# Patient Record
Sex: FEMALE | Race: OTHER | URBAN - METROPOLITAN AREA
[De-identification: names, ages, dates, MRNs, and addresses within clinical notes are randomized per-mention and may not be internally consistent; named-entity substitution may affect disease eponyms.]

---

## 2020-10-29 ENCOUNTER — INPATIENT (INPATIENT)
Facility: HOSPITAL | Age: 75
LOS: 6 days | Discharge: NURSING, REHAB OR LONG-TERM CARE | DRG: 25 | End: 2020-11-05
Attending: NEUROLOGICAL SURGERY | Admitting: NEUROLOGICAL SURGERY
Payer: MEDICARE

## 2020-10-29 VITALS
OXYGEN SATURATION: 96 % | DIASTOLIC BLOOD PRESSURE: 99 MMHG | TEMPERATURE: 98 F | SYSTOLIC BLOOD PRESSURE: 176 MMHG | HEART RATE: 66 BPM | HEIGHT: 68 IN | WEIGHT: 179.9 LBS | RESPIRATION RATE: 18 BRPM

## 2020-10-29 DIAGNOSIS — D49.6 NEOPLASM OF UNSPECIFIED BEHAVIOR OF BRAIN: ICD-10-CM

## 2020-10-29 DIAGNOSIS — I10 ESSENTIAL (PRIMARY) HYPERTENSION: ICD-10-CM

## 2020-10-29 LAB
ALBUMIN SERPL ELPH-MCNC: 3.8 G/DL — SIGNIFICANT CHANGE UP (ref 3.3–5)
ALP SERPL-CCNC: 58 U/L — SIGNIFICANT CHANGE UP (ref 40–120)
ALT FLD-CCNC: 21 U/L — SIGNIFICANT CHANGE UP (ref 10–45)
ANION GAP SERPL CALC-SCNC: 10 MMOL/L — SIGNIFICANT CHANGE UP (ref 5–17)
APTT BLD: 22.8 SEC — LOW (ref 27.5–35.5)
AST SERPL-CCNC: 17 U/L — SIGNIFICANT CHANGE UP (ref 10–40)
BASOPHILS # BLD AUTO: 0.01 K/UL — SIGNIFICANT CHANGE UP (ref 0–0.2)
BASOPHILS NFR BLD AUTO: 0.1 % — SIGNIFICANT CHANGE UP (ref 0–2)
BILIRUB SERPL-MCNC: 0.3 MG/DL — SIGNIFICANT CHANGE UP (ref 0.2–1.2)
BLD GP AB SCN SERPL QL: NEGATIVE — SIGNIFICANT CHANGE UP
BUN SERPL-MCNC: 13 MG/DL — SIGNIFICANT CHANGE UP (ref 7–23)
CALCIUM SERPL-MCNC: 9.3 MG/DL — SIGNIFICANT CHANGE UP (ref 8.4–10.5)
CHLORIDE SERPL-SCNC: 104 MMOL/L — SIGNIFICANT CHANGE UP (ref 96–108)
CO2 SERPL-SCNC: 27 MMOL/L — SIGNIFICANT CHANGE UP (ref 22–31)
CREAT SERPL-MCNC: 0.65 MG/DL — SIGNIFICANT CHANGE UP (ref 0.5–1.3)
EOSINOPHIL # BLD AUTO: 0 K/UL — SIGNIFICANT CHANGE UP (ref 0–0.5)
EOSINOPHIL NFR BLD AUTO: 0 % — SIGNIFICANT CHANGE UP (ref 0–6)
GLUCOSE BLDC GLUCOMTR-MCNC: 122 MG/DL — HIGH (ref 70–99)
GLUCOSE SERPL-MCNC: 130 MG/DL — HIGH (ref 70–99)
HCT VFR BLD CALC: 47.1 % — HIGH (ref 34.5–45)
HGB BLD-MCNC: 15.3 G/DL — SIGNIFICANT CHANGE UP (ref 11.5–15.5)
IMM GRANULOCYTES NFR BLD AUTO: 0.8 % — SIGNIFICANT CHANGE UP (ref 0–1.5)
INR BLD: 1.01 — SIGNIFICANT CHANGE UP (ref 0.88–1.16)
LYMPHOCYTES # BLD AUTO: 0.76 K/UL — LOW (ref 1–3.3)
LYMPHOCYTES # BLD AUTO: 8.4 % — LOW (ref 13–44)
MCHC RBC-ENTMCNC: 29.6 PG — SIGNIFICANT CHANGE UP (ref 27–34)
MCHC RBC-ENTMCNC: 32.5 GM/DL — SIGNIFICANT CHANGE UP (ref 32–36)
MCV RBC AUTO: 91.1 FL — SIGNIFICANT CHANGE UP (ref 80–100)
MONOCYTES # BLD AUTO: 0.43 K/UL — SIGNIFICANT CHANGE UP (ref 0–0.9)
MONOCYTES NFR BLD AUTO: 4.7 % — SIGNIFICANT CHANGE UP (ref 2–14)
NEUTROPHILS # BLD AUTO: 7.83 K/UL — HIGH (ref 1.8–7.4)
NEUTROPHILS NFR BLD AUTO: 86 % — HIGH (ref 43–77)
NRBC # BLD: 0 /100 WBCS — SIGNIFICANT CHANGE UP (ref 0–0)
PLATELET # BLD AUTO: 225 K/UL — SIGNIFICANT CHANGE UP (ref 150–400)
POTASSIUM SERPL-MCNC: 4.4 MMOL/L — SIGNIFICANT CHANGE UP (ref 3.5–5.3)
POTASSIUM SERPL-SCNC: 4.4 MMOL/L — SIGNIFICANT CHANGE UP (ref 3.5–5.3)
PROT SERPL-MCNC: 5.8 G/DL — LOW (ref 6–8.3)
PROTHROM AB SERPL-ACNC: 12.1 SEC — SIGNIFICANT CHANGE UP (ref 10.6–13.6)
RBC # BLD: 5.17 M/UL — SIGNIFICANT CHANGE UP (ref 3.8–5.2)
RBC # FLD: 12.6 % — SIGNIFICANT CHANGE UP (ref 10.3–14.5)
RH IG SCN BLD-IMP: POSITIVE — SIGNIFICANT CHANGE UP
SARS-COV-2 RNA SPEC QL NAA+PROBE: SIGNIFICANT CHANGE UP
SODIUM SERPL-SCNC: 141 MMOL/L — SIGNIFICANT CHANGE UP (ref 135–145)
WBC # BLD: 9.1 K/UL — SIGNIFICANT CHANGE UP (ref 3.8–10.5)
WBC # FLD AUTO: 9.1 K/UL — SIGNIFICANT CHANGE UP (ref 3.8–10.5)

## 2020-10-29 PROCEDURE — 99222 1ST HOSP IP/OBS MODERATE 55: CPT

## 2020-10-29 PROCEDURE — 71045 X-RAY EXAM CHEST 1 VIEW: CPT | Mod: 26

## 2020-10-29 PROCEDURE — 93970 EXTREMITY STUDY: CPT | Mod: 26

## 2020-10-29 PROCEDURE — 99285 EMERGENCY DEPT VISIT HI MDM: CPT | Mod: CS

## 2020-10-29 RX ORDER — CARVEDILOL PHOSPHATE 80 MG/1
6.25 CAPSULE, EXTENDED RELEASE ORAL EVERY 12 HOURS
Refills: 0 | Status: DISCONTINUED | OUTPATIENT
Start: 2020-10-29 | End: 2020-11-02

## 2020-10-29 RX ORDER — DEXTROSE 50 % IN WATER 50 %
25 SYRINGE (ML) INTRAVENOUS ONCE
Refills: 0 | Status: DISCONTINUED | OUTPATIENT
Start: 2020-10-29 | End: 2020-11-02

## 2020-10-29 RX ORDER — LEVETIRACETAM 250 MG/1
500 TABLET, FILM COATED ORAL
Refills: 0 | Status: DISCONTINUED | OUTPATIENT
Start: 2020-10-29 | End: 2020-11-02

## 2020-10-29 RX ORDER — PANTOPRAZOLE SODIUM 20 MG/1
40 TABLET, DELAYED RELEASE ORAL
Refills: 0 | Status: DISCONTINUED | OUTPATIENT
Start: 2020-10-29 | End: 2020-11-02

## 2020-10-29 RX ORDER — ACETAMINOPHEN 500 MG
650 TABLET ORAL EVERY 6 HOURS
Refills: 0 | Status: DISCONTINUED | OUTPATIENT
Start: 2020-10-29 | End: 2020-11-02

## 2020-10-29 RX ORDER — DEXAMETHASONE 0.5 MG/5ML
4 ELIXIR ORAL
Refills: 0 | Status: DISCONTINUED | OUTPATIENT
Start: 2020-10-29 | End: 2020-10-29

## 2020-10-29 RX ORDER — DEXTROSE 50 % IN WATER 50 %
12.5 SYRINGE (ML) INTRAVENOUS ONCE
Refills: 0 | Status: DISCONTINUED | OUTPATIENT
Start: 2020-10-29 | End: 2020-11-02

## 2020-10-29 RX ORDER — LEVETIRACETAM 250 MG/1
1 TABLET, FILM COATED ORAL
Qty: 0 | Refills: 0 | DISCHARGE

## 2020-10-29 RX ORDER — SODIUM CHLORIDE 9 MG/ML
1000 INJECTION, SOLUTION INTRAVENOUS
Refills: 0 | Status: DISCONTINUED | OUTPATIENT
Start: 2020-10-29 | End: 2020-11-02

## 2020-10-29 RX ORDER — CARVEDILOL PHOSPHATE 80 MG/1
1 CAPSULE, EXTENDED RELEASE ORAL
Qty: 0 | Refills: 0 | DISCHARGE

## 2020-10-29 RX ORDER — SENNA PLUS 8.6 MG/1
2 TABLET ORAL AT BEDTIME
Refills: 0 | Status: DISCONTINUED | OUTPATIENT
Start: 2020-10-29 | End: 2020-11-02

## 2020-10-29 RX ORDER — GLUCAGON INJECTION, SOLUTION 0.5 MG/.1ML
1 INJECTION, SOLUTION SUBCUTANEOUS ONCE
Refills: 0 | Status: DISCONTINUED | OUTPATIENT
Start: 2020-10-29 | End: 2020-11-02

## 2020-10-29 RX ORDER — INSULIN LISPRO 100/ML
VIAL (ML) SUBCUTANEOUS
Refills: 0 | Status: DISCONTINUED | OUTPATIENT
Start: 2020-10-29 | End: 2020-11-02

## 2020-10-29 RX ORDER — DEXTROSE 50 % IN WATER 50 %
15 SYRINGE (ML) INTRAVENOUS ONCE
Refills: 0 | Status: DISCONTINUED | OUTPATIENT
Start: 2020-10-29 | End: 2020-11-02

## 2020-10-29 RX ORDER — DEXAMETHASONE 0.5 MG/5ML
4 ELIXIR ORAL EVERY 6 HOURS
Refills: 0 | Status: DISCONTINUED | OUTPATIENT
Start: 2020-10-29 | End: 2020-11-02

## 2020-10-29 RX ORDER — ENOXAPARIN SODIUM 100 MG/ML
40 INJECTION SUBCUTANEOUS AT BEDTIME
Refills: 0 | Status: DISCONTINUED | OUTPATIENT
Start: 2020-10-29 | End: 2020-11-01

## 2020-10-29 RX ADMIN — ENOXAPARIN SODIUM 40 MILLIGRAM(S): 100 INJECTION SUBCUTANEOUS at 22:27

## 2020-10-29 RX ADMIN — Medication 0: at 22:21

## 2020-10-29 NOTE — ED PROVIDER NOTE - CLINICAL SUMMARY MEDICAL DECISION MAKING FREE TEXT BOX
Patient presents to ED at Dr. Bosch's request for admission following recent diagnosis of brain mass.  Patient notes ongoing balance issues and difficulty finding words, prompting her evaluation and recent diagnosis.  Patient is in NAD on arrival to ED, speaking in full sentences, PERRL.  Neurosx team in ED to evaluate and requesting regional admission to Dr. Bosch with pre op testing.  Patient and son at bedside aware of plan for admission and in agreement.

## 2020-10-29 NOTE — CHART NOTE - NSCHARTNOTEFT_GEN_A_CORE
Neurosurgical Indications for Screening Dopplers on Admission:       1) Known hypercoagulation disorder (h/o VTE, thrombophilia, HIT, etc.)   2) Admitted from prolonged stay from another institution (straight forward ER transfers not included)  3) Presenting with significant leg immobility   4) Presenting with signs and symptoms of VTE?    5) With significant critical illness, Including "found down" for unknown period of time in HPI  6) With significant neurotrauma (TBI, SCI / TLS spine fractures)   7) Who are comatose   8) With known malignancy (e.g. glioblastoma multiforme, meningioma, etc.). Excludes IA chemo 23hr observation stays  9) On hemodialysis   10) Who have received platelet transfusion or antithrombotic reversal agents recently   11) Who have had recent major orthopedic surgery          Screening dopplers indicated?   Y x  N _  DVT Prophylaxis:  x SCD's   x chemoprophylaxis

## 2020-10-29 NOTE — ED PROVIDER NOTE - PHYSICAL EXAMINATION
VITAL SIGNS: I have reviewed nursing notes and confirm.  CONSTITUTIONAL: Well-developed; well-nourished; in no acute distress.   SKIN:  warm and dry, no acute rash.   HEAD:  normocephalic, atraumatic.  EYES: PERRL.  EOM intact; conjunctiva and sclera clear.  ENT: No nasal discharge; airway clear.   NECK: Supple; non tender.  CARD: S1, S2 normal; no murmurs, gallops, or rubs. Regular rate and rhythm.   RESP:  Clear to auscultation b/l, no wheezes, rales or rhonchi.  ABD: Normal bowel sounds; soft; non-distended; non-tender; no guarding/rebound.  EXT: Normal ROM. No clubbing, cyanosis or edema. 2+ pulses to b/l ue/le.  NEURO: Speaking clearly without slurred speech.  Alert, oriented, grossly unremarkable.  5/5 strength x 4 extremities against gravity and external force.  No drift x 4 extremities.  Sensation intact and symmetric x 4 extremities.  No facial asymmetry.    PSYCH: Cooperative, mood and affect appropriate.

## 2020-10-29 NOTE — H&P ADULT - NSHPLABSRESULTS_GEN_ALL_CORE
15.3   9.10  )-----------( 225      ( 29 Oct 2020 18:53 )             47.1   10-29    141  |  104  |  13  ----------------------------<  130<H>  4.4   |  27  |  0.65    Ca    9.3      29 Oct 2020 18:53    TPro  5.8<L>  /  Alb  3.8  /  TBili  0.3  /  DBili  x   /  AST  17  /  ALT  21  /  AlkPhos  58  10-29

## 2020-10-29 NOTE — ED PROVIDER NOTE - NS ED ROS FT
Constitutional: No fever or chills.   Eyes: No pain, blurry vision, or discharge.  ENMT: No hearing changes, pain, discharge or infections. No neck pain or stiffness.  Cardiac: No chest pain, SOB or edema. No chest pain with exertion.  Respiratory: No cough or respiratory distress. No hemoptysis. No history of asthma or RAD.  GI: No nausea, vomiting, diarrhea or abdominal pain.  : No dysuria, frequency or burning.  MS: No myalgia, muscle weakness, joint pain or back pain.  Neuro: + Ongoing balance issues, + difficulty finding words.  No headache or weakness. No LOC.  Skin: No skin rash.   Endocrine: No history of thyroid disease or diabetes.  Except as documented in the HPI, all other systems are negative.

## 2020-10-29 NOTE — H&P ADULT - HISTORY OF PRESENT ILLNESS
75 y.o. female with HTN 75 y.o. female with HTN presents today after discharge from hospital in [place], sent by Dr. Bosch for findings of large brain mass on CT/MR of the brain. Patient and her son at bedside report several months of worsening word finding difficulty, as well as reading and writing. Patient denies any headaches, visual/hearing changes, extremity weakness or numbness, gait disturbance, syncope or seizures. She denies any constitutional symptoms such as weight loss, generalized fatigue or anorexia. Patient comes with CDs with CT and MR. She denies use of Aspirin, or other anticoagulation. Patient was started on Keppra and Decadron in previous facility.

## 2020-10-29 NOTE — H&P ADULT - PROBLEM SELECTOR PLAN 2
Admit to Neurosurgery, Dr. Bosch, regional,  Neuro checks,  Tylenol PRN pain,  Continue Keppra 500mg BID for seizure ppx,  Continue Decadron w/ PPI for brain mass w/ vasogenic edema,  CT/MR CDs to be loaded to our PACS,  MRI Brain w/ leslie navigation ordered,  LE dopplers to r/o DVT - negative, will do SCDs/SQL,  Pre-op for OR Monday,  Medical clearance,  D/w Dr. Bosch

## 2020-10-29 NOTE — ED ADULT NURSE NOTE - ED STAT RN HANDOFF DETAILS
pt in US at this time, admitted for tumor removal surgery, no bed assigned yet, report given to holding nurse.

## 2020-10-29 NOTE — ED PROVIDER NOTE - OBJECTIVE STATEMENT
75 year old female with history of recently diagnosed brain tumor presents to ED at Dr. Lozano's request for pre op testing and admission.  Son at bedside notes she had been experiencing increased difficulty finding her words and imbalance over the past several months.  She was evaluated by her primary team and had imaging showing what son believes is 4cm mass.  Son states he contacted Dr. Lozano as he has friends who have seen him and following review of case, was instructed to bring her to [facility] for admission.  Patient is awake, alert and in NAD on arrival to ED.  She denies headache currently, visual changes, chest pain, shortness of breath, abdominal pain, nausea, emesis, changes to bowel movements, peripheral edema, rashes, recent travel or any additional acute complaints or concerns at this time.

## 2020-10-29 NOTE — ED ADULT NURSE NOTE - CHPI ED NUR SYMPTOMS NEG
no fever/no loss of consciousness/no blurred vision/no confusion/no weakness/no change in level of consciousness/no dizziness/no nausea/no numbness/no vomiting

## 2020-10-29 NOTE — ED ADULT NURSE NOTE - OBJECTIVE STATEMENT
75y F, Alert to person and place, brought in by son under recommendations under Dr. Green. Per son, pt has had a progressively increasing in size tumor to brain and reports increase in difficulty finding words. On arrival pt denies any, cp, sob, fevers, chills, cough, numbness nor tingling. PT here for preop for dr Green under neuro.

## 2020-10-29 NOTE — H&P ADULT - PROBLEM SELECTOR PLAN 1
Continue Carvedilol while inpatient,  Patient reports other PO medications, will f/u patient's pharmacy

## 2020-10-29 NOTE — ED ADULT TRIAGE NOTE - CHIEF COMPLAINT QUOTE
Pt presents to ED as a direct admit under MD Singh for brain tumor. PT denies any pain,n,v but states the tumor is affecting her ability to speak at times.

## 2020-10-30 LAB
A1C WITH ESTIMATED AVERAGE GLUCOSE RESULT: 5.6 % — SIGNIFICANT CHANGE UP (ref 4–5.6)
ANION GAP SERPL CALC-SCNC: 12 MMOL/L — SIGNIFICANT CHANGE UP (ref 5–17)
APPEARANCE UR: CLEAR — SIGNIFICANT CHANGE UP
BILIRUB UR-MCNC: NEGATIVE — SIGNIFICANT CHANGE UP
BUN SERPL-MCNC: 12 MG/DL — SIGNIFICANT CHANGE UP (ref 7–23)
CALCIUM SERPL-MCNC: 9 MG/DL — SIGNIFICANT CHANGE UP (ref 8.4–10.5)
CHLORIDE SERPL-SCNC: 106 MMOL/L — SIGNIFICANT CHANGE UP (ref 96–108)
CO2 SERPL-SCNC: 26 MMOL/L — SIGNIFICANT CHANGE UP (ref 22–31)
COLOR SPEC: YELLOW — SIGNIFICANT CHANGE UP
CREAT SERPL-MCNC: 0.62 MG/DL — SIGNIFICANT CHANGE UP (ref 0.5–1.3)
DIFF PNL FLD: NEGATIVE — SIGNIFICANT CHANGE UP
ESTIMATED AVERAGE GLUCOSE: 114 MG/DL — SIGNIFICANT CHANGE UP (ref 68–114)
GLUCOSE BLDC GLUCOMTR-MCNC: 120 MG/DL — HIGH (ref 70–99)
GLUCOSE BLDC GLUCOMTR-MCNC: 136 MG/DL — HIGH (ref 70–99)
GLUCOSE BLDC GLUCOMTR-MCNC: 144 MG/DL — HIGH (ref 70–99)
GLUCOSE BLDC GLUCOMTR-MCNC: 166 MG/DL — HIGH (ref 70–99)
GLUCOSE SERPL-MCNC: 131 MG/DL — HIGH (ref 70–99)
GLUCOSE UR QL: NEGATIVE — SIGNIFICANT CHANGE UP
HCT VFR BLD CALC: 44.4 % — SIGNIFICANT CHANGE UP (ref 34.5–45)
HGB BLD-MCNC: 14.6 G/DL — SIGNIFICANT CHANGE UP (ref 11.5–15.5)
KETONES UR-MCNC: NEGATIVE — SIGNIFICANT CHANGE UP
LEUKOCYTE ESTERASE UR-ACNC: NEGATIVE — SIGNIFICANT CHANGE UP
MAGNESIUM SERPL-MCNC: 2.4 MG/DL — SIGNIFICANT CHANGE UP (ref 1.6–2.6)
MCHC RBC-ENTMCNC: 29.4 PG — SIGNIFICANT CHANGE UP (ref 27–34)
MCHC RBC-ENTMCNC: 32.9 GM/DL — SIGNIFICANT CHANGE UP (ref 32–36)
MCV RBC AUTO: 89.5 FL — SIGNIFICANT CHANGE UP (ref 80–100)
NITRITE UR-MCNC: NEGATIVE — SIGNIFICANT CHANGE UP
NRBC # BLD: 0 /100 WBCS — SIGNIFICANT CHANGE UP (ref 0–0)
PH UR: 7 — SIGNIFICANT CHANGE UP (ref 5–8)
PHOSPHATE SERPL-MCNC: 4.4 MG/DL — SIGNIFICANT CHANGE UP (ref 2.5–4.5)
PLATELET # BLD AUTO: 200 K/UL — SIGNIFICANT CHANGE UP (ref 150–400)
POTASSIUM SERPL-MCNC: 4.1 MMOL/L — SIGNIFICANT CHANGE UP (ref 3.5–5.3)
POTASSIUM SERPL-SCNC: 4.1 MMOL/L — SIGNIFICANT CHANGE UP (ref 3.5–5.3)
PROT UR-MCNC: NEGATIVE MG/DL — SIGNIFICANT CHANGE UP
RBC # BLD: 4.96 M/UL — SIGNIFICANT CHANGE UP (ref 3.8–5.2)
RBC # FLD: 12.9 % — SIGNIFICANT CHANGE UP (ref 10.3–14.5)
SODIUM SERPL-SCNC: 144 MMOL/L — SIGNIFICANT CHANGE UP (ref 135–145)
SP GR SPEC: 1.01 — SIGNIFICANT CHANGE UP (ref 1–1.03)
UROBILINOGEN FLD QL: 1 E.U./DL — SIGNIFICANT CHANGE UP
WBC # BLD: 6.57 K/UL — SIGNIFICANT CHANGE UP (ref 3.8–10.5)
WBC # FLD AUTO: 6.57 K/UL — SIGNIFICANT CHANGE UP (ref 3.8–10.5)

## 2020-10-30 PROCEDURE — 99232 SBSQ HOSP IP/OBS MODERATE 35: CPT

## 2020-10-30 RX ORDER — CHLORHEXIDINE GLUCONATE 213 G/1000ML
1 SOLUTION TOPICAL
Refills: 0 | Status: DISCONTINUED | OUTPATIENT
Start: 2020-10-30 | End: 2020-10-30

## 2020-10-30 RX ORDER — AMLODIPINE BESYLATE 2.5 MG/1
10 TABLET ORAL DAILY
Refills: 0 | Status: DISCONTINUED | OUTPATIENT
Start: 2020-10-30 | End: 2020-11-02

## 2020-10-30 RX ORDER — LISINOPRIL 2.5 MG/1
40 TABLET ORAL DAILY
Refills: 0 | Status: DISCONTINUED | OUTPATIENT
Start: 2020-10-30 | End: 2020-11-02

## 2020-10-30 RX ORDER — CHLORHEXIDINE GLUCONATE 213 G/1000ML
1 SOLUTION TOPICAL
Refills: 0 | Status: COMPLETED | OUTPATIENT
Start: 2020-10-30 | End: 2020-11-02

## 2020-10-30 RX ADMIN — CARVEDILOL PHOSPHATE 6.25 MILLIGRAM(S): 80 CAPSULE, EXTENDED RELEASE ORAL at 06:28

## 2020-10-30 RX ADMIN — Medication 4 MILLIGRAM(S): at 06:28

## 2020-10-30 RX ADMIN — CHLORHEXIDINE GLUCONATE 1 APPLICATION(S): 213 SOLUTION TOPICAL at 12:55

## 2020-10-30 RX ADMIN — AMLODIPINE BESYLATE 10 MILLIGRAM(S): 2.5 TABLET ORAL at 02:08

## 2020-10-30 RX ADMIN — CARVEDILOL PHOSPHATE 6.25 MILLIGRAM(S): 80 CAPSULE, EXTENDED RELEASE ORAL at 17:18

## 2020-10-30 RX ADMIN — PANTOPRAZOLE SODIUM 40 MILLIGRAM(S): 20 TABLET, DELAYED RELEASE ORAL at 06:28

## 2020-10-30 RX ADMIN — Medication 4 MILLIGRAM(S): at 00:41

## 2020-10-30 RX ADMIN — ENOXAPARIN SODIUM 40 MILLIGRAM(S): 100 INJECTION SUBCUTANEOUS at 23:05

## 2020-10-30 RX ADMIN — LISINOPRIL 40 MILLIGRAM(S): 2.5 TABLET ORAL at 02:08

## 2020-10-30 RX ADMIN — LEVETIRACETAM 500 MILLIGRAM(S): 250 TABLET, FILM COATED ORAL at 06:28

## 2020-10-30 RX ADMIN — Medication 4 MILLIGRAM(S): at 23:05

## 2020-10-30 RX ADMIN — Medication 2: at 22:03

## 2020-10-30 RX ADMIN — Medication 5 MILLIGRAM(S): at 06:29

## 2020-10-30 RX ADMIN — Medication 4 MILLIGRAM(S): at 17:18

## 2020-10-30 RX ADMIN — Medication 4 MILLIGRAM(S): at 12:55

## 2020-10-30 RX ADMIN — LEVETIRACETAM 500 MILLIGRAM(S): 250 TABLET, FILM COATED ORAL at 17:18

## 2020-10-30 NOTE — PROGRESS NOTE ADULT - SUBJECTIVE AND OBJECTIVE BOX
HPI:  75 y.o. female with HTN presents today after discharge from hospital in [place], sent by Dr. oBsch for findings of large brain mass on CT/MR of the brain. Patient and her son at bedside report several months of worsening word finding difficulty, as well as reading and writing. Patient denies any headaches, visual/hearing changes, extremity weakness or numbness, gait disturbance, syncope or seizures. She denies any constitutional symptoms such as weight loss, generalized fatigue or anorexia. Patient comes with CDs with CT and MR. She denies use of Aspirin, or other anticoagulation. Patient was started on Keppra and Decadron in previous facility. (29 Oct 2020 19:05)    OVERNIGHT EVENTS: LORENZA o/n, neuro stable    Hospital Course:   10/29: admitted to [facility] for brain tumor w/u. dopplers negative  10/30: LORENZA o/n, neuro stable. pending MR nelson, pre op monday    Vital Signs Last 24 Hrs  T(C): 36.7 (30 Oct 2020 01:41), Max: 36.7 (29 Oct 2020 17:34)  T(F): 98 (30 Oct 2020 01:41), Max: 98 (29 Oct 2020 17:34)  HR: 58 (30 Oct 2020 01:41) (56 - 66)  BP: 157/126 (30 Oct 2020 01:41) (151/87 - 176/99)  BP(mean): --  RR: 18 (30 Oct 2020 01:41) (17 - 18)  SpO2: 96% (30 Oct 2020 01:41) (95% - 96%)    I&O's Summary      PHYSICAL EXAM:  GEN: laying in bed, appears well, NAD  NEURO: AOx3. FC, OE spont, speech intact, +R facial. CNII-XII intact. PERRL, EOMI. No pronator drift. MAEx4. 5/5 strength throughout. SILT  CV: RRR +S1/S2  PULM: CTAB  GI: Abd soft, NT/ND  EXT: ext warm, dry, nontender    TUBES/LINES:  [] [place]  [] Lumbar Drain  [] Wound Drains  [] Others      DIET:  [] NPO  [x] Mechanical  [] Tube feeds    LABS:                        15.3   9.10  )-----------( 225      ( 29 Oct 2020 18:53 )             47.1     10-29    141  |  104  |  13  ----------------------------<  130<H>  4.4   |  27  |  0.65    Ca    9.3      29 Oct 2020 18:53    TPro  5.8<L>  /  Alb  3.8  /  TBili  0.3  /  DBili  x   /  AST  17  /  ALT  21  /  AlkPhos  58  10-29    PT/INR - ( 29 Oct 2020 18:53 )   PT: 12.1 sec;   INR: 1.01          PTT - ( 29 Oct 2020 18:53 )  PTT:22.8 sec        CAPILLARY BLOOD GLUCOSE      POCT Blood Glucose.: 122 mg/dL (29 Oct 2020 21:31)      Drug Levels: [] N/A    CSF Analysis: [] N/A      Allergies    codeine (Unknown)  sulfa drugs (Unknown)    Intolerances      MEDICATIONS:  Antibiotics:    Neuro:  acetaminophen   Tablet .. 650 milliGRAM(s) Oral every 6 hours PRN  levETIRAcetam 500 milliGRAM(s) Oral two times a day    Anticoagulation:  enoxaparin Injectable 40 milliGRAM(s) SubCutaneous at bedtime    OTHER:  amLODIPine   Tablet 10 milliGRAM(s) Oral daily  bisacodyl 5 milliGRAM(s) Oral daily PRN  carvedilol 6.25 milliGRAM(s) Oral every 12 hours  dexAMETHasone     Tablet 4 milliGRAM(s) Oral every 6 hours  dextrose 40% Gel 15 Gram(s) Oral once PRN  dextrose 50% Injectable 12.5 Gram(s) IV Push once  dextrose 50% Injectable 25 Gram(s) IV Push once  dextrose 50% Injectable 25 Gram(s) IV Push once  glucagon  Injectable 1 milliGRAM(s) IntraMuscular once PRN  insulin lispro (ADMELOG) corrective regimen sliding scale   SubCutaneous Before meals and at bedtime  lisinopril 40 milliGRAM(s) Oral daily  pantoprazole    Tablet 40 milliGRAM(s) Oral before breakfast  senna 2 Tablet(s) Oral at bedtime PRN    IVF:  dextrose 5%. 1000 milliLiter(s) IV Continuous <Continuous>    CULTURES:    RADIOLOGY & ADDITIONAL TESTS:      ASSESSMENT:  75 year old female with HTN, large left brain mass with worsening expressive aphasia.      BRAIN TUMOR    No pertinent family history in first degree relatives    Handoff    MEWS Score    Brain tumor    HTN (hypertension)    Brain tumor    Brain tumor    Essential hypertension    No significant past surgical history    MED EVAL    [email or web address]        PLAN:  NEURO:  - neuro checks q4h  - pain control   - decadron 4q6  - keppra 500 bid  - MR nelson pending  - pre op Monday mass resection   - PT/OT    CARDIOVASCULAR:  - normotensive SBP goal   - cont home lisinopril, norvasc, carvedilol     PULMONARY:  - IS    RENAL:  - IVL   - repletions prn     GI:  - regular diet  - bowel regimen  - gi ppx protonix    HEME:  - h/h stable  - SCDs/SQL  - dopplers 10/29 negative    ID:  - afebrile  - no leukocytosis     ENDO:  - ISS    DVT PROPHYLAXIS:  [x] Venodynes                                [x] Heparin/Lovenox    DISPOSITION: full code, stable, dispo pending    D/w Dr. Bosch    Assessment:  Present when checked    []  GCS  E   V  M     Heart Failure: []Acute, [] acute on chronic , []chronic  Heart Failure:  [] Diastolic (HFpEF), [] Systolic (HFrEF), []Combined (HFpEF and HFrEF), [] RHF, [] Pulm HTN, [] Other    [] REYNA, [] ATN, [] AIN, [] other  [] CKD1, [] CKD2, [] CKD 3, [] CKD 4, [] CKD 5, []ESRD    Encephalopathy: [] Metabolic, [] Hepatic, [] toxic, [] Neurological, [] Other    Abnormal Nurtitional Status: [] malnurtition (see nutrition note), [ ]underweight: BMI < 19, [] morbid obesity: BMI >40, [] Cachexia    [] Sepsis  [] hypovolemic shock,[] cardiogenic shock, [] hemorrhagic shock, [] neuogenic shock  [] Acute Respiratory Failure  []Cerebral edema, [] Brain compression/ herniation,   [] Functional quadriplegia  [] Acute blood loss anemia

## 2020-10-31 DIAGNOSIS — I10 ESSENTIAL (PRIMARY) HYPERTENSION: ICD-10-CM

## 2020-10-31 DIAGNOSIS — Z01.818 ENCOUNTER FOR OTHER PREPROCEDURAL EXAMINATION: ICD-10-CM

## 2020-10-31 LAB
ANION GAP SERPL CALC-SCNC: 11 MMOL/L — SIGNIFICANT CHANGE UP (ref 5–17)
BUN SERPL-MCNC: 13 MG/DL — SIGNIFICANT CHANGE UP (ref 7–23)
CALCIUM SERPL-MCNC: 9 MG/DL — SIGNIFICANT CHANGE UP (ref 8.4–10.5)
CHLORIDE SERPL-SCNC: 105 MMOL/L — SIGNIFICANT CHANGE UP (ref 96–108)
CO2 SERPL-SCNC: 25 MMOL/L — SIGNIFICANT CHANGE UP (ref 22–31)
CREAT SERPL-MCNC: 0.58 MG/DL — SIGNIFICANT CHANGE UP (ref 0.5–1.3)
GLUCOSE BLDC GLUCOMTR-MCNC: 113 MG/DL — HIGH (ref 70–99)
GLUCOSE BLDC GLUCOMTR-MCNC: 121 MG/DL — HIGH (ref 70–99)
GLUCOSE BLDC GLUCOMTR-MCNC: 125 MG/DL — HIGH (ref 70–99)
GLUCOSE BLDC GLUCOMTR-MCNC: 144 MG/DL — HIGH (ref 70–99)
GLUCOSE SERPL-MCNC: 138 MG/DL — HIGH (ref 70–99)
HCT VFR BLD CALC: 46.2 % — HIGH (ref 34.5–45)
HGB BLD-MCNC: 15 G/DL — SIGNIFICANT CHANGE UP (ref 11.5–15.5)
MAGNESIUM SERPL-MCNC: 2.5 MG/DL — SIGNIFICANT CHANGE UP (ref 1.6–2.6)
MCHC RBC-ENTMCNC: 29.4 PG — SIGNIFICANT CHANGE UP (ref 27–34)
MCHC RBC-ENTMCNC: 32.5 GM/DL — SIGNIFICANT CHANGE UP (ref 32–36)
MCV RBC AUTO: 90.4 FL — SIGNIFICANT CHANGE UP (ref 80–100)
NRBC # BLD: 0 /100 WBCS — SIGNIFICANT CHANGE UP (ref 0–0)
PHOSPHATE SERPL-MCNC: 4.3 MG/DL — SIGNIFICANT CHANGE UP (ref 2.5–4.5)
PLATELET # BLD AUTO: 194 K/UL — SIGNIFICANT CHANGE UP (ref 150–400)
POTASSIUM SERPL-MCNC: 3.9 MMOL/L — SIGNIFICANT CHANGE UP (ref 3.5–5.3)
POTASSIUM SERPL-SCNC: 3.9 MMOL/L — SIGNIFICANT CHANGE UP (ref 3.5–5.3)
RBC # BLD: 5.11 M/UL — SIGNIFICANT CHANGE UP (ref 3.8–5.2)
RBC # FLD: 12.6 % — SIGNIFICANT CHANGE UP (ref 10.3–14.5)
SODIUM SERPL-SCNC: 141 MMOL/L — SIGNIFICANT CHANGE UP (ref 135–145)
WBC # BLD: 6.33 K/UL — SIGNIFICANT CHANGE UP (ref 3.8–10.5)
WBC # FLD AUTO: 6.33 K/UL — SIGNIFICANT CHANGE UP (ref 3.8–10.5)

## 2020-10-31 PROCEDURE — 99232 SBSQ HOSP IP/OBS MODERATE 35: CPT

## 2020-10-31 PROCEDURE — 70553 MRI BRAIN STEM W/O & W/DYE: CPT | Mod: 26

## 2020-10-31 PROCEDURE — 99221 1ST HOSP IP/OBS SF/LOW 40: CPT

## 2020-10-31 RX ADMIN — CHLORHEXIDINE GLUCONATE 1 APPLICATION(S): 213 SOLUTION TOPICAL at 06:12

## 2020-10-31 RX ADMIN — Medication 4 MILLIGRAM(S): at 06:12

## 2020-10-31 RX ADMIN — AMLODIPINE BESYLATE 10 MILLIGRAM(S): 2.5 TABLET ORAL at 06:12

## 2020-10-31 RX ADMIN — LEVETIRACETAM 500 MILLIGRAM(S): 250 TABLET, FILM COATED ORAL at 06:12

## 2020-10-31 RX ADMIN — PANTOPRAZOLE SODIUM 40 MILLIGRAM(S): 20 TABLET, DELAYED RELEASE ORAL at 06:12

## 2020-10-31 RX ADMIN — CARVEDILOL PHOSPHATE 6.25 MILLIGRAM(S): 80 CAPSULE, EXTENDED RELEASE ORAL at 12:05

## 2020-10-31 RX ADMIN — ENOXAPARIN SODIUM 40 MILLIGRAM(S): 100 INJECTION SUBCUTANEOUS at 21:28

## 2020-10-31 RX ADMIN — LEVETIRACETAM 500 MILLIGRAM(S): 250 TABLET, FILM COATED ORAL at 17:09

## 2020-10-31 RX ADMIN — Medication 4 MILLIGRAM(S): at 17:09

## 2020-10-31 RX ADMIN — Medication 4 MILLIGRAM(S): at 12:05

## 2020-10-31 RX ADMIN — LISINOPRIL 40 MILLIGRAM(S): 2.5 TABLET ORAL at 06:12

## 2020-10-31 NOTE — CONSULT NOTE ADULT - PROBLEM SELECTOR RECOMMENDATION 3
TTE is pending. Dr. Fish will discuss with Neurosurgery   The patient's medical condition is optimized for surgery.  There is no contraindication for surgery.  There is no clinical evidence neither of angina, decompensated CHF, arrhthymias, nor valvular disease.   There is no limitation of exercise capacity.  MET is 3 .  ASA class is 3 .  Sanchez cardiac risk factor is 1.4 % .  DVT prophylaxis is indicated.  Pain control.  Early mobilization.  Avoid fluid overload.

## 2020-10-31 NOTE — CONSULT NOTE ADULT - SUBJECTIVE AND OBJECTIVE BOX
Patient is a 75y old  Female who presents with a chief complaint of Brain Tumor (30 Oct 2020 01:51)      HPI:  75 y.o. female with HTN presents today after discharge from hospital in [place], sent by Dr. Bosch for findings of large brain mass on CT/MR of the brain. Patient and her son at bedside report several months of worsening word finding difficulty, as well as reading and writing. Patient denies any headaches, visual/hearing changes, extremity weakness or numbness, gait disturbance, syncope or seizures. She denies any constitutional symptoms such as weight loss, generalized fatigue or anorexia. Patient comes with CDs with CT and MR. She denies use of Aspirin, or other anticoagulation. Patient was started on Keppra and Decadron in previous facility. 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 Denies MI, CVA, dyspnea on exertion.      PAST MEDICAL & SURGICAL HISTORY:  Brain tumor    HTN (hypertension)    No significant past surgical history        FAMILY HISTORY:  No pertinent family history in first degree relatives        SOCIAL HISTORY:  Smoking Status: [ ] Current, [ ] Former, [ ] Never  Pack Years:    MEDICATIONS:  Pulmonary:    Antimicrobials:    Anticoagulants:  enoxaparin Injectable 40 milliGRAM(s) SubCutaneous at bedtime    Onc:    GI/:  bisacodyl 5 milliGRAM(s) Oral daily PRN  pantoprazole    Tablet 40 milliGRAM(s) Oral before breakfast  senna 2 Tablet(s) Oral at bedtime PRN    Endocrine:  dexAMETHasone     Tablet 4 milliGRAM(s) Oral every 6 hours  dextrose 40% Gel 15 Gram(s) Oral once PRN  dextrose 50% Injectable 12.5 Gram(s) IV Push once  dextrose 50% Injectable 25 Gram(s) IV Push once  dextrose 50% Injectable 25 Gram(s) IV Push once  glucagon  Injectable 1 milliGRAM(s) IntraMuscular once PRN  insulin lispro (ADMELOG) corrective regimen sliding scale   SubCutaneous Before meals and at bedtime    Cardiac:  amLODIPine   Tablet 10 milliGRAM(s) Oral daily  carvedilol 6.25 milliGRAM(s) Oral every 12 hours  lisinopril 40 milliGRAM(s) Oral daily    Other Medications:  acetaminophen   Tablet .. 650 milliGRAM(s) Oral every 6 hours PRN  chlorhexidine 2% Cloths 1 Application(s) Topical <User Schedule>  dextrose 5%. 1000 milliLiter(s) IV Continuous <Continuous>  levETIRAcetam 500 milliGRAM(s) Oral two times a day      Allergies    codeine (Unknown)  sulfa drugs (Unknown)    Intolerances        Review of Systems:   •	General: negative  •	Skin/Breast: negative  •	Ophthalmologic: negative  •	ENMT: negative  •	Respiratory and Thorax: negative  •	Cardiovascular: negative  •	Gastrointestinal: negative  •	Genitourinary: negative  •	Musculoskeletal: negative  •	Neurological: negative  •	Psychiatric: negative  •	Hematology/Lymphatics: negative  •	Endocrine: negative  •	Allergic/Immunologic: negative    Physical Exam:   • Constitutional:	Well-developed, well nourished, AAO x3  • Eyes:	EOMI; PERRL; no drainage or redness  • ENMT:	No oral lesions; no gross abnormalities, has upper and lower dentures  • Neck	No bruits; no thyromegaly or nodules  • Breasts:	not examined  • Back:	No deformity or limitation of movement  • Respiratory:	Breath Sounds equal & clear to auscultation, no accessory muscle use  • Cardiovascular:	Regular rate & rhythm, normal S1, S2; no murmurs, gallops or rubs; no S3, S4  • Gastrointestinal:	Soft, non-tender, no hepatosplenomegaly, normal bowel sounds  • Genitourinary:	not examined  • Rectal: not examined  • Extremities:	No cyanosis, clubbing or edema  • Vascular:	Equal and normal pulses (dorsalis pedis)  • Neurologica:l	not examined  • Skin:	No lesions; no rash  • Lymph Nodes:	No lymphadedenopathy  • Musculoskeletal:	No joint pain, swelling or deformity; no limitation of movement      Vital Signs Last 24 Hrs  T(C): 36.6 (31 Oct 2020 05:46), Max: 36.8 (30 Oct 2020 21:26)  T(F): 97.8 (31 Oct 2020 05:46), Max: 98.2 (30 Oct 2020 21:26)  HR: 59 (31 Oct 2020 05:46) (59 - 72)  BP: 157/82 (31 Oct 2020 05:46) (146/83 - 183/108)  BP(mean): 117 (30 Oct 2020 18:41) (117 - 133)  RR: 18 (31 Oct 2020 05:46) (17 - 18)  SpO2: 97% (31 Oct 2020 05:46) (94% - 97%)        LABS:      CBC Full  -  ( 31 Oct 2020 06:29 )  WBC Count : 6.33 K/uL  RBC Count : 5.11 M/uL  Hemoglobin : 15.0 g/dL  Hematocrit : 46.2 %  Platelet Count - Automated : 194 K/uL  Mean Cell Volume : 90.4 fl  Mean Cell Hemoglobin : 29.4 pg  Mean Cell Hemoglobin Concentration : 32.5 gm/dL  Auto Neutrophil # : x  Auto Lymphocyte # : x  Auto Monocyte # : x  Auto Eosinophil # : x  Auto Basophil # : x  Auto Neutrophil % : x  Auto Lymphocyte % : x  Auto Monocyte % : x  Auto Eosinophil % : x  Auto Basophil % : x    10    141  |  105  |  13  ----------------------------<  138<H>  3.9   |  25  |  0.58    Ca    9.0      31 Oct 2020 06:29  Phos  4.3     10-31  Mg     2.5     10-31    TPro  5.8<L>  /  Alb  3.8  /  TBili  0.3  /  DBili  x   /  AST  17  /  ALT  21  /  AlkPhos  58  10-29    PT/INR - ( 29 Oct 2020 18:53 )   PT: 12.1 sec;   INR: 1.01          PTT - ( 29 Oct 2020 18:53 )  PTT:22.8 sec      Urinalysis Basic - ( 30 Oct 2020 01:57 )    Color: Yellow / Appearance: Clear / S.015 / pH: x  Gluc: x / Ketone: NEGATIVE  / Bili: Negative / Urobili: 1.0 E.U./dL   Blood: x / Protein: NEGATIVE mg/dL / Nitrite: NEGATIVE   Leuk Esterase: NEGATIVE / RBC: x / WBC x   Sq Epi: x / Non Sq Epi: x / Bacteria: x                RADIOLOGY & ADDITIONAL STUDIES (The following images were personally reviewed):    ECG  10/29/2020  NSR, 51 bpm no ST elevation     CXR  10/29/2020  cardiomegaly, thoracic aorta ectasia, lungs NAD         Patient is a 75y old  Female who presents with a chief complaint of Brain Tumor (30 Oct 2020 01:51)      HPI:  75 y.o. female with HTN presents today after discharge from hospital in [place], sent by Dr. Bosch for findings of large brain mass on CT/MR of the brain. Patient and her son at bedside report several months of worsening word finding difficulty, as well as reading and writing. Patient denies any headaches, visual/hearing changes, extremity weakness or numbness, gait disturbance, syncope or seizures. She denies any constitutional symptoms such as weight loss, generalized fatigue or anorexia. Patient comes with CDs with CT and MR. She denies use of Aspirin, or other anticoagulation. Patient was started on Keppra and Decadron in previous facility. 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 Denies MI, CVA, dyspnea on exertion.      PAST MEDICAL & SURGICAL HISTORY:  Brain tumor    HTN (hypertension)    No significant past surgical history        FAMILY HISTORY:  No pertinent family history in first degree relatives  father  95y/o natural  mother  77y/o MI        SOCIAL HISTORY:  Smoking Status: [ ] Current, [ ] Former, [x} Never  Pack Years:    MEDICATIONS:  Pulmonary:    Antimicrobials:    Anticoagulants:  enoxaparin Injectable 40 milliGRAM(s) SubCutaneous at bedtime    Onc:    GI/:  bisacodyl 5 milliGRAM(s) Oral daily PRN  pantoprazole    Tablet 40 milliGRAM(s) Oral before breakfast  senna 2 Tablet(s) Oral at bedtime PRN    Endocrine:  dexAMETHasone     Tablet 4 milliGRAM(s) Oral every 6 hours  dextrose 40% Gel 15 Gram(s) Oral once PRN  dextrose 50% Injectable 12.5 Gram(s) IV Push once  dextrose 50% Injectable 25 Gram(s) IV Push once  dextrose 50% Injectable 25 Gram(s) IV Push once  glucagon  Injectable 1 milliGRAM(s) IntraMuscular once PRN  insulin lispro (ADMELOG) corrective regimen sliding scale   SubCutaneous Before meals and at bedtime    Cardiac:  amLODIPine   Tablet 10 milliGRAM(s) Oral daily  carvedilol 6.25 milliGRAM(s) Oral every 12 hours  lisinopril 40 milliGRAM(s) Oral daily    Other Medications:  acetaminophen   Tablet .. 650 milliGRAM(s) Oral every 6 hours PRN  chlorhexidine 2% Cloths 1 Application(s) Topical <User Schedule>  dextrose 5%. 1000 milliLiter(s) IV Continuous <Continuous>  levETIRAcetam 500 milliGRAM(s) Oral two times a day      Allergies    codeine (Unknown)  sulfa drugs (Unknown)    Intolerances        Review of Systems:   •	General: negative  •	Skin/Breast: negative  •	Ophthalmologic: negative  •	ENMT: negative  •	Respiratory and Thorax: negative  •	Cardiovascular: negative  •	Gastrointestinal: negative  •	Genitourinary: negative  •	Musculoskeletal: negative  •	Neurological: negative  •	Psychiatric: negative  •	Hematology/Lymphatics: negative  •	Endocrine: negative  •	Allergic/Immunologic: negative    Physical Exam:   • Constitutional:	Well-developed, well nourished, AAO x3  • Eyes:	EOMI; PERRL; no drainage or redness  • ENMT:	No oral lesions; no gross abnormalities, has upper and lower dentures  • Neck	No bruits; no thyromegaly or nodules  • Breasts:	not examined  • Back:	No deformity or limitation of movement  • Respiratory:	Breath Sounds equal & clear to auscultation, no accessory muscle use  • Cardiovascular:	Regular rate & rhythm, normal S1, S2; no murmurs, gallops or rubs; no S3, S4  • Gastrointestinal:	Soft, non-tender, no hepatosplenomegaly, normal bowel sounds  • Genitourinary:	not examined  • Rectal: not examined  • Extremities:	No cyanosis, clubbing or edema  • Vascular:	Equal and normal pulses (dorsalis pedis)  • Neurologica:l	not examined  • Skin:	No lesions; no rash  • Lymph Nodes:	No lymphadedenopathy  • Musculoskeletal:	No joint pain, swelling or deformity; no limitation of movement      Vital Signs Last 24 Hrs  T(C): 36.6 (31 Oct 2020 05:46), Max: 36.8 (30 Oct 2020 21:26)  T(F): 97.8 (31 Oct 2020 05:46), Max: 98.2 (30 Oct 2020 21:26)  HR: 59 (31 Oct 2020 05:46) (59 - 72)  BP: 157/82 (31 Oct 2020 05:46) (146/83 - 183/108)  BP(mean): 117 (30 Oct 2020 18:41) (117 - 133)  RR: 18 (31 Oct 2020 05:46) (17 - 18)  SpO2: 97% (31 Oct 2020 05:46) (94% - 97%)        LABS:      CBC Full  -  ( 31 Oct 2020 06:29 )  WBC Count : 6.33 K/uL  RBC Count : 5.11 M/uL  Hemoglobin : 15.0 g/dL  Hematocrit : 46.2 %  Platelet Count - Automated : 194 K/uL  Mean Cell Volume : 90.4 fl  Mean Cell Hemoglobin : 29.4 pg  Mean Cell Hemoglobin Concentration : 32.5 gm/dL  Auto Neutrophil # : x  Auto Lymphocyte # : x  Auto Monocyte # : x  Auto Eosinophil # : x  Auto Basophil # : x  Auto Neutrophil % : x  Auto Lymphocyte % : x  Auto Monocyte % : x  Auto Eosinophil % : x  Auto Basophil % : x    10    141  |  105  |  13  ----------------------------<  138<H>  3.9   |  25  |  0.58    Ca    9.0      31 Oct 2020 06:29  Phos  4.3     10  Mg     2.5     10-31    TPro  5.8<L>  /  Alb  3.8  /  TBili  0.3  /  DBili  x   /  AST  17  /  ALT  21  /  AlkPhos  58  10-29    PT/INR - ( 29 Oct 2020 18:53 )   PT: 12.1 sec;   INR: 1.01          PTT - ( 29 Oct 2020 18:53 )  PTT:22.8 sec      Urinalysis Basic - ( 30 Oct 2020 01:57 )    Color: Yellow / Appearance: Clear / S.015 / pH: x  Gluc: x / Ketone: NEGATIVE  / Bili: Negative / Urobili: 1.0 E.U./dL   Blood: x / Protein: NEGATIVE mg/dL / Nitrite: NEGATIVE   Leuk Esterase: NEGATIVE / RBC: x / WBC x   Sq Epi: x / Non Sq Epi: x / Bacteria: x                RADIOLOGY & ADDITIONAL STUDIES (The following images were personally reviewed):    ECG  10/29/2020  NSR, 51 bpm no ST elevation     CXR  10/29/2020  cardiomegaly, thoracic aorta ectasia, lungs NAD

## 2020-10-31 NOTE — CONSULT NOTE ADULT - ASSESSMENT
74 y/o RHD female PMH HTN, brain mass presents with expressive aphasia and further management by Neurosurgery.

## 2020-11-01 ENCOUNTER — TRANSCRIPTION ENCOUNTER (OUTPATIENT)
Age: 75
End: 2020-11-01

## 2020-11-01 LAB
ANION GAP SERPL CALC-SCNC: 9 MMOL/L — SIGNIFICANT CHANGE UP (ref 5–17)
BUN SERPL-MCNC: 14 MG/DL — SIGNIFICANT CHANGE UP (ref 7–23)
CALCIUM SERPL-MCNC: 8.9 MG/DL — SIGNIFICANT CHANGE UP (ref 8.4–10.5)
CHLORIDE SERPL-SCNC: 103 MMOL/L — SIGNIFICANT CHANGE UP (ref 96–108)
CO2 SERPL-SCNC: 27 MMOL/L — SIGNIFICANT CHANGE UP (ref 22–31)
CREAT SERPL-MCNC: 0.62 MG/DL — SIGNIFICANT CHANGE UP (ref 0.5–1.3)
GLUCOSE BLDC GLUCOMTR-MCNC: 112 MG/DL — HIGH (ref 70–99)
GLUCOSE BLDC GLUCOMTR-MCNC: 123 MG/DL — HIGH (ref 70–99)
GLUCOSE BLDC GLUCOMTR-MCNC: 139 MG/DL — HIGH (ref 70–99)
GLUCOSE BLDC GLUCOMTR-MCNC: 181 MG/DL — HIGH (ref 70–99)
GLUCOSE SERPL-MCNC: 138 MG/DL — HIGH (ref 70–99)
HCT VFR BLD CALC: 44.3 % — SIGNIFICANT CHANGE UP (ref 34.5–45)
HGB BLD-MCNC: 14.4 G/DL — SIGNIFICANT CHANGE UP (ref 11.5–15.5)
MAGNESIUM SERPL-MCNC: 2.5 MG/DL — SIGNIFICANT CHANGE UP (ref 1.6–2.6)
MCHC RBC-ENTMCNC: 29 PG — SIGNIFICANT CHANGE UP (ref 27–34)
MCHC RBC-ENTMCNC: 32.5 GM/DL — SIGNIFICANT CHANGE UP (ref 32–36)
MCV RBC AUTO: 89.1 FL — SIGNIFICANT CHANGE UP (ref 80–100)
NRBC # BLD: 0 /100 WBCS — SIGNIFICANT CHANGE UP (ref 0–0)
PHOSPHATE SERPL-MCNC: 4.2 MG/DL — SIGNIFICANT CHANGE UP (ref 2.5–4.5)
PLATELET # BLD AUTO: 196 K/UL — SIGNIFICANT CHANGE UP (ref 150–400)
POTASSIUM SERPL-MCNC: 4.3 MMOL/L — SIGNIFICANT CHANGE UP (ref 3.5–5.3)
POTASSIUM SERPL-SCNC: 4.3 MMOL/L — SIGNIFICANT CHANGE UP (ref 3.5–5.3)
RBC # BLD: 4.97 M/UL — SIGNIFICANT CHANGE UP (ref 3.8–5.2)
RBC # FLD: 12.6 % — SIGNIFICANT CHANGE UP (ref 10.3–14.5)
SODIUM SERPL-SCNC: 139 MMOL/L — SIGNIFICANT CHANGE UP (ref 135–145)
WBC # BLD: 7.44 K/UL — SIGNIFICANT CHANGE UP (ref 3.8–10.5)
WBC # FLD AUTO: 7.44 K/UL — SIGNIFICANT CHANGE UP (ref 3.8–10.5)

## 2020-11-01 PROCEDURE — 99232 SBSQ HOSP IP/OBS MODERATE 35: CPT

## 2020-11-01 RX ORDER — SODIUM CHLORIDE 9 MG/ML
1000 INJECTION INTRAMUSCULAR; INTRAVENOUS; SUBCUTANEOUS
Refills: 0 | Status: DISCONTINUED | OUTPATIENT
Start: 2020-11-01 | End: 2020-11-02

## 2020-11-01 RX ORDER — POVIDONE-IODINE 5 %
1 AEROSOL (ML) TOPICAL ONCE
Refills: 0 | Status: COMPLETED | OUTPATIENT
Start: 2020-11-01 | End: 2020-11-02

## 2020-11-01 RX ORDER — AMINOLEVULINIC ACID HYDROCHLORIDE 1500 MG/1
1500 POWDER, FOR SOLUTION ORAL ONCE
Refills: 0 | Status: COMPLETED | OUTPATIENT
Start: 2020-11-02 | End: 2020-11-02

## 2020-11-01 RX ADMIN — Medication 4 MILLIGRAM(S): at 12:52

## 2020-11-01 RX ADMIN — CARVEDILOL PHOSPHATE 6.25 MILLIGRAM(S): 80 CAPSULE, EXTENDED RELEASE ORAL at 12:52

## 2020-11-01 RX ADMIN — CHLORHEXIDINE GLUCONATE 1 APPLICATION(S): 213 SOLUTION TOPICAL at 06:08

## 2020-11-01 RX ADMIN — AMLODIPINE BESYLATE 10 MILLIGRAM(S): 2.5 TABLET ORAL at 06:03

## 2020-11-01 RX ADMIN — Medication 4 MILLIGRAM(S): at 00:17

## 2020-11-01 RX ADMIN — Medication 2: at 22:19

## 2020-11-01 RX ADMIN — CARVEDILOL PHOSPHATE 6.25 MILLIGRAM(S): 80 CAPSULE, EXTENDED RELEASE ORAL at 23:05

## 2020-11-01 RX ADMIN — LEVETIRACETAM 500 MILLIGRAM(S): 250 TABLET, FILM COATED ORAL at 17:56

## 2020-11-01 RX ADMIN — CARVEDILOL PHOSPHATE 6.25 MILLIGRAM(S): 80 CAPSULE, EXTENDED RELEASE ORAL at 00:17

## 2020-11-01 RX ADMIN — Medication 4 MILLIGRAM(S): at 06:03

## 2020-11-01 RX ADMIN — Medication 4 MILLIGRAM(S): at 23:05

## 2020-11-01 RX ADMIN — Medication 4 MILLIGRAM(S): at 17:56

## 2020-11-01 RX ADMIN — LEVETIRACETAM 500 MILLIGRAM(S): 250 TABLET, FILM COATED ORAL at 06:03

## 2020-11-01 RX ADMIN — PANTOPRAZOLE SODIUM 40 MILLIGRAM(S): 20 TABLET, DELAYED RELEASE ORAL at 06:03

## 2020-11-01 RX ADMIN — LISINOPRIL 40 MILLIGRAM(S): 2.5 TABLET ORAL at 06:04

## 2020-11-01 NOTE — PROGRESS NOTE ADULT - PROBLEM SELECTOR PLAN 3
TTE pending  The patient's medical condition is optimized for surgery.  There is no contraindication for surgery.  There is no clinical evidence neither of angina, decompensated CHF, arrhthymias, nor valvular disease.   There is no limitation of exercise capacity.  MET is 3 .  ASA class is 3 .  Sanchez cardiac risk factor is 1.4% .  DVT prophylaxis is indicated.  Pain control.  Early mobilization.  Avoid fluid overload.

## 2020-11-01 NOTE — PROGRESS NOTE ADULT - SUBJECTIVE AND OBJECTIVE BOX
Surgery: Left temporal craniectomy for tumor resection    Consent: Signed by patient vs HCP    HCP: Son  [phone]    Allergies  codeine (Unknown)  sulfa drugs (Unknown)    OVERNIGHT EVENTS: LORENZA overnight     T(C): 36.9 (11-01-20 @ 13:06), Max: 37.2 (11-01-20 @ 05:50)  HR: 64 (11-01-20 @ 13:06) (57 - 73)  BP: 136/86 (11-01-20 @ 13:06) (131/83 - 146/87)  RR: 17 (11-01-20 @ 13:06) (16 - 18)  SpO2: 96% (11-01-20 @ 13:06) (96% - 97%)  Wt(kg): --    EXAM:  GEN: seated in bed, appears well, NAD   NEURO: AOx3. FC, OE spont, speech intact, +R facial. CNII-XII intact. PERRL, EOMI. No pronator drift. MAEx4. 5/5 strength throughout. SILT  CV: +S1/S2, normal sinus rhythm  PULM: CTA B/L   GI: Abd soft, NT/ND  EXT: ext warm, dry, nontender    11-01    139  |  103  |  14  ----------------------------<  138<H>  4.3   |  27  |  0.62    Ca    8.9      01 Nov 2020 06:07  Phos  4.2     11-01  Mg     2.5     11-01      CBC Full  -  ( 01 Nov 2020 06:07 )  WBC Count : 7.44 K/uL  RBC Count : 4.97 M/uL  Hemoglobin : 14.4 g/dL  Hematocrit : 44.3 %  Platelet Count - Automated : 196 K/uL  Mean Cell Volume : 89.1 fl  Mean Cell Hemoglobin : 29.0 pg  Mean Cell Hemoglobin Concentration : 32.5 gm/dL  Auto Neutrophil # : x  Auto Lymphocyte # : x  Auto Monocyte # : x  Auto Eosinophil # : x  Auto Basophil # : x  Auto Neutrophil % : x  Auto Lymphocyte % : x  Auto Monocyte % : x  Auto Eosinophil % : x  Auto Basophil % : x        Pregnancy test: N/A  Type & Screen (in past 72hrs): Pending collection    CXR: last 10/29 (cardiomegaly, thoracic aortic ectasia)   EKG: last 10/29 (sinus bradycardia)   ECHO: pending  Medical Clearances: cleared     Last dose of antiplatelet/anticoagulation drug: N/A    Implanted Devices (pacemaker, drug pump...etc):  []YES   [x] NO                  If yes --> EPS consulted to interrogate/adjust device    LAST COVID SWAB: Negative    Cranial surgery: Order written for hair to be shampooed night before surgery  [x] yes   []no               Assessment: 75 year old female with HTN, 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      Plan:  - OR 11/2  - 5 ALA in AM  - Type & Screen ordered   - NPO after midnight  - Fluids while NPO  - Chlorhex ordered   - Consent given and in chart   - medically cleared for OR       D/W Dr. Bosch    Assessment:  Present when checked    []  GCS  E   V  M     Heart Failure: []Acute, [] acute on chronic , []chronic  Heart Failure:  [] Diastolic (HFpEF), [] Systolic (HFrEF), []Combined (HFpEF and HFrEF), [] RHF, [] Pulm HTN, [] Other    [] REYNA, [] ATN, [] AIN, [] other  [] CKD1, [] CKD2, [] CKD 3, [] CKD 4, [] CKD 5, []ESRD    Encephalopathy: [] Metabolic, [] Hepatic, [] toxic, [] Neurological, [] Other    Abnormal Nurtitional Status: [] malnurtition (see nutrition note), [ ]underweight: BMI < 19, [] morbid obesity: BMI >40, [] Cachexia    [] Sepsis  [] hypovolemic shock,[] cardiogenic shock, [] hemorrhagic shock, [] neuogenic shock  [] Acute Respiratory Failure  []Cerebral edema, [] Brain compression/ herniation,   [] Functional quadriplegia  [] Acute blood loss anemia

## 2020-11-01 NOTE — PROGRESS NOTE ADULT - SUBJECTIVE AND OBJECTIVE BOX
Interval Events: Reviewed  Patient seen and examined at bedside.    Patient is a 75y old  Female who presents with a chief complaint of Brain Tumor (01 Nov 2020 04:38)  no acute event overnight, positive BM yesterday       PAST MEDICAL & SURGICAL HISTORY:  Brain tumor    HTN (hypertension)    No significant past surgical history        MEDICATIONS:  Pulmonary:    Antimicrobials:    Anticoagulants:  enoxaparin Injectable 40 milliGRAM(s) SubCutaneous at bedtime    Cardiac:  amLODIPine   Tablet 10 milliGRAM(s) Oral daily  carvedilol 6.25 milliGRAM(s) Oral every 12 hours  lisinopril 40 milliGRAM(s) Oral daily      Allergies    codeine (Unknown)  sulfa drugs (Unknown)    Intolerances        Vital Signs Last 24 Hrs  T(C): 37.2 (01 Nov 2020 05:50), Max: 37.2 (01 Nov 2020 05:50)  T(F): 98.9 (01 Nov 2020 05:50), Max: 98.9 (01 Nov 2020 05:50)  HR: 57 (01 Nov 2020 05:50) (57 - 73)  BP: 146/87 (01 Nov 2020 05:50) (131/83 - 155/89)  BP(mean): --  RR: 18 (01 Nov 2020 05:50) (16 - 18)  SpO2: 97% (01 Nov 2020 05:50) (96% - 98%)        Review of Systems:   •	General: negative  •	Skin/Breast: negative  •	Ophthalmologic: negative  •	ENMT: negative  •	Respiratory and Thorax: negative  •	Cardiovascular: negative  •	Gastrointestinal: negative  •	Genitourinary: negative  •	Musculoskeletal: negative  •	Neurological: negative  •	Psychiatric: negative  •	Hematology/Lymphatics: negative  •	Endocrine: negative  •	Allergic/Immunologic: negative    Physical Exam:   • Constitutional:	Well-developed, well nourished  • Eyes:	EOMI; PERRL; no drainage or redness  • ENMT:	No oral lesions; no gross abnormalities  • Neck	no thyromegaly or nodules  • Breasts:	not examined  • Back:	No deformity or limitation of movement  • Respiratory:	Breath Sounds equal & clear to auscultation, no accessory muscle use  • Cardiovascular:	Regular rate & rhythm, normal S1, S2; no murmurs, gallops or rubs; no S3, S4  • Gastrointestinal:	Soft, non-tender, no hepatosplenomegaly, normal bowel sounds  • Genitourinary:	not examined  • Rectal: not examined  • Extremities:	No cyanosis, clubbing or edema  • Vascular:	Equal and normal pulses (dorsalis pedis)  • Neurologica:l	not examined  • Skin:	No lesions; no rash  • Lymph Nodes:	No lymphadedenopathy  • Musculoskeletal:	No joint pain, swelling or deformity; no limitation of movement        LABS:      CBC Full  -  ( 01 Nov 2020 06:07 )  WBC Count : 7.44 K/uL  RBC Count : 4.97 M/uL  Hemoglobin : 14.4 g/dL  Hematocrit : 44.3 %  Platelet Count - Automated : 196 K/uL  Mean Cell Volume : 89.1 fl  Mean Cell Hemoglobin : 29.0 pg  Mean Cell Hemoglobin Concentration : 32.5 gm/dL  Auto Neutrophil # : x  Auto Lymphocyte # : x  Auto Monocyte # : x  Auto Eosinophil # : x  Auto Basophil # : x  Auto Neutrophil % : x  Auto Lymphocyte % : x  Auto Monocyte % : x  Auto Eosinophil % : x  Auto Basophil % : x    11-01    139  |  103  |  14  ----------------------------<  138<H>  4.3   |  27  |  0.62    Ca    8.9      01 Nov 2020 06:07  Phos  4.2     11-01  Mg     2.5     11-01                          RADIOLOGY & ADDITIONAL STUDIES (The following images were personally reviewed):  Boston:                                     No  Urine output:                       adequate  DVT prophylaxis:                 Yes  Flattus:                                  Yes  Bowel movement:              No

## 2020-11-01 NOTE — PROGRESS NOTE ADULT - SUBJECTIVE AND OBJECTIVE BOX
HPI:  75 y.o. female with HTN presents today after discharge from hospital in [place], sent by Dr. Bosch for findings of large brain mass on CT/MR of the brain. Patient and her son at bedside report several months of worsening word finding difficulty, as well as reading and writing. Patient denies any headaches, visual/hearing changes, extremity weakness or numbness, gait disturbance, syncope or seizures. She denies any constitutional symptoms such as weight loss, generalized fatigue or anorexia. Patient comes with CDs with CT and MR. She denies use of Aspirin, or other anticoagulation. Patient was started on Keppra and Decadron in previous facility. (29 Oct 2020 19:05)    OVERNIGHT EVENTS: LORENZA o/n, neuro stable    Hospital Course:   10/29: admitted to [facility] for brain tumor w/u. dopplers negative  10/30: LORENZA o/n, neuro stable. pending MR nelson, pre op monday 11/1: LORENZA o/n, improved aphasia, on decadron. preop for monday. MR nelson done. med clearance pending     ICU Vital Signs Last 24 Hrs  T(C): 36.9 (31 Oct 2020 21:00), Max: 36.9 (31 Oct 2020 21:00)  T(F): 98.5 (31 Oct 2020 21:00), Max: 98.5 (31 Oct 2020 21:00)  HR: 73 (31 Oct 2020 21:00) (59 - 73)  BP: 131/83 (31 Oct 2020 21:00) (131/83 - 157/82)  BP(mean): --  ABP: --  ABP(mean): --  RR: 16 (31 Oct 2020 21:00) (16 - 18)  SpO2: 96% (31 Oct 2020 21:00) (96% - 98%)      I&O's Summary  I&O's Summary      PHYSICAL EXAM:  GEN: laying in bed, appears well, NAD  NEURO: AOx3. FC, OE spont, speech intact, +R facial. CNII-XII intact. PERRL, EOMI. No pronator drift. MAEx4. 5/5 strength throughout. SILT  CV: RRR +S1/S2  PULM: CTAB  GI: Abd soft, NT/ND  EXT: ext warm, dry, nontender    TUBES/LINES:  [] Boston  [] Lumbar Drain  [] Wound Drains  [] Others      DIET:  [] NPO  [x] Mechanical  [] Tube feeds    LABS:                                     15.0   6.33  )-----------( 194      ( 31 Oct 2020 06:29 )             46.2     10-29  10-31    141  |  105  |  13  ----------------------------<  138<H>  3.9   |  25  |  0.58    Ca    9.0      31 Oct 2020 06:29  Phos  4.3     10-31  Mg     2.5     10-31      Ca    9.3      29 Oct 2020 18:53    TPro  5.8<L>  /  Alb  3.8  /  TBili  0.3  /  DBili  x   /  AST  17  /  ALT  21  /  AlkPhos  58  10-29    PT/INR - ( 29 Oct 2020 18:53 )   PT: 12.1 sec;   INR: 1.01          PTT - ( 29 Oct 2020 18:53 )  PTT:22.8 sec        CAPILLARY BLOOD GLUCOSE      POCT Blood Glucose.: 122 mg/dL (29 Oct 2020 21:31)      Drug Levels: [] N/A    CSF Analysis: [] N/A      Allergies    codeine (Unknown)  sulfa drugs (Unknown)    Intolerances      MEDICATIONS:  amLODIPine   Tablet 10 milliGRAM(s) Oral daily  carvedilol 6.25 milliGRAM(s) Oral every 12 hours  chlorhexidine 2% Cloths 1 Application(s) Topical <User Schedule>  dexAMETHasone     Tablet 4 milliGRAM(s) Oral every 6 hours  dextrose 5%. 1000 milliLiter(s) (50 mL/Hr) IV Continuous <Continuous>  dextrose 50% Injectable 12.5 Gram(s) IV Push once  dextrose 50% Injectable 25 Gram(s) IV Push once  dextrose 50% Injectable 25 Gram(s) IV Push once  enoxaparin Injectable 40 milliGRAM(s) SubCutaneous at bedtime  insulin lispro (ADMELOG) corrective regimen sliding scale   SubCutaneous Before meals and at bedtime  levETIRAcetam 500 milliGRAM(s) Oral two times a day  lisinopril 40 milliGRAM(s) Oral daily  pantoprazole    Tablet 40 milliGRAM(s) Oral before breakfast    MEDICATIONS  (PRN):  acetaminophen   Tablet .. 650 milliGRAM(s) Oral every 6 hours PRN Temp greater or equal to 38C (100.4F), Mild Pain (1 - 3)  bisacodyl 5 milliGRAM(s) Oral daily PRN Constipation  dextrose 40% Gel 15 Gram(s) Oral once PRN Blood Glucose LESS THAN 70 milliGRAM(s)/deciliter  glucagon  Injectable 1 milliGRAM(s) IntraMuscular once PRN Glucose LESS THAN 70 milligrams/deciliter  senna 2 Tablet(s) Oral at bedtime PRN Constipation      IVF:  dextrose 5%. 1000 milliLiter(s) IV Continuous <Continuous>    CULTURES:    RADIOLOGY & ADDITIONAL TESTS:      ASSESSMENT:  75 year old female with HTN, 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      BRAIN TUMOR    No pertinent family history in first degree relatives    Handoff    MEWS Score    Brain tumor    HTN (hypertension)    Brain tumor    Brain tumor    Essential hypertension    No significant past surgical history    MED EVAL    [email or web address]        PLAN:  NEURO:  - neuro checks q4h  - pain control   - decadron 4q6  - keppra 500 bid  - MR nelson done  - pre op Monday mass resection   - medically cleared for OR   - PT/OT    CARDIOVASCULAR:  - normotensive SBP goal   - cont home lisinopril, norvasc, carvedilol     PULMONARY:  - IS    RENAL:  - IVL   - repletions prn     GI:  - regular diet  - bowel regimen  - gi ppx protonix    HEME:  - h/h stable  - SCDs/SQL  - dopplers 10/29 negative    ID:  - afebrile  - no leukocytosis     ENDO:  - ISS    DVT PROPHYLAXIS:  [x] Venodynes                                [x] Heparin/Lovenox    DISPOSITION: full code, stable, dispo pending    D/w Dr. Bosch    Assessment:  Present when checked    []  GCS  E   V  M     Heart Failure: []Acute, [] acute on chronic , []chronic  Heart Failure:  [] Diastolic (HFpEF), [] Systolic (HFrEF), []Combined (HFpEF and HFrEF), [] RHF, [] Pulm HTN, [] Other    [] REYNA, [] ATN, [] AIN, [] other  [] CKD1, [] CKD2, [] CKD 3, [] CKD 4, [] CKD 5, []ESRD    Encephalopathy: [] Metabolic, [] Hepatic, [] toxic, [] Neurological, [] Other    Abnormal Nurtitional Status: [] malnurtition (see nutrition note), [ ]underweight: BMI < 19, [] morbid obesity: BMI >40, [] Cachexia    [] Sepsis  [] hypovolemic shock,[] cardiogenic shock, [] hemorrhagic shock, [] neuogenic shock  [] Acute Respiratory Failure  []Cerebral edema, [] Brain compression/ herniation,   [] Functional quadriplegia  [] Acute blood loss anemia

## 2020-11-02 ENCOUNTER — RESULT REVIEW (OUTPATIENT)
Age: 75
End: 2020-11-02

## 2020-11-02 LAB
ANION GAP SERPL CALC-SCNC: 10 MMOL/L — SIGNIFICANT CHANGE UP (ref 5–17)
ANION GAP SERPL CALC-SCNC: 10 MMOL/L — SIGNIFICANT CHANGE UP (ref 5–17)
BLD GP AB SCN SERPL QL: NEGATIVE — SIGNIFICANT CHANGE UP
BUN SERPL-MCNC: 12 MG/DL — SIGNIFICANT CHANGE UP (ref 7–23)
BUN SERPL-MCNC: 9 MG/DL — SIGNIFICANT CHANGE UP (ref 7–23)
CALCIUM SERPL-MCNC: 8.6 MG/DL — SIGNIFICANT CHANGE UP (ref 8.4–10.5)
CALCIUM SERPL-MCNC: 8.8 MG/DL — SIGNIFICANT CHANGE UP (ref 8.4–10.5)
CHLORIDE SERPL-SCNC: 103 MMOL/L — SIGNIFICANT CHANGE UP (ref 96–108)
CHLORIDE SERPL-SCNC: 103 MMOL/L — SIGNIFICANT CHANGE UP (ref 96–108)
CO2 SERPL-SCNC: 23 MMOL/L — SIGNIFICANT CHANGE UP (ref 22–31)
CO2 SERPL-SCNC: 24 MMOL/L — SIGNIFICANT CHANGE UP (ref 22–31)
CREAT SERPL-MCNC: 0.43 MG/DL — LOW (ref 0.5–1.3)
CREAT SERPL-MCNC: 0.5 MG/DL — SIGNIFICANT CHANGE UP (ref 0.5–1.3)
GLUCOSE BLDC GLUCOMTR-MCNC: 112 MG/DL — HIGH (ref 70–99)
GLUCOSE BLDC GLUCOMTR-MCNC: 128 MG/DL — HIGH (ref 70–99)
GLUCOSE BLDC GLUCOMTR-MCNC: 137 MG/DL — HIGH (ref 70–99)
GLUCOSE SERPL-MCNC: 132 MG/DL — HIGH (ref 70–99)
GLUCOSE SERPL-MCNC: 187 MG/DL — HIGH (ref 70–99)
HCT VFR BLD CALC: 37.7 % — SIGNIFICANT CHANGE UP (ref 34.5–45)
HGB BLD-MCNC: 12.6 G/DL — SIGNIFICANT CHANGE UP (ref 11.5–15.5)
MAGNESIUM SERPL-MCNC: 2.5 MG/DL — SIGNIFICANT CHANGE UP (ref 1.6–2.6)
MCHC RBC-ENTMCNC: 29.5 PG — SIGNIFICANT CHANGE UP (ref 27–34)
MCHC RBC-ENTMCNC: 33.4 GM/DL — SIGNIFICANT CHANGE UP (ref 32–36)
MCV RBC AUTO: 88.3 FL — SIGNIFICANT CHANGE UP (ref 80–100)
NRBC # BLD: 0 /100 WBCS — SIGNIFICANT CHANGE UP (ref 0–0)
PHOSPHATE SERPL-MCNC: 4.1 MG/DL — SIGNIFICANT CHANGE UP (ref 2.5–4.5)
PLATELET # BLD AUTO: 166 K/UL — SIGNIFICANT CHANGE UP (ref 150–400)
POTASSIUM SERPL-MCNC: 3.9 MMOL/L — SIGNIFICANT CHANGE UP (ref 3.5–5.3)
POTASSIUM SERPL-MCNC: 4 MMOL/L — SIGNIFICANT CHANGE UP (ref 3.5–5.3)
POTASSIUM SERPL-SCNC: 3.9 MMOL/L — SIGNIFICANT CHANGE UP (ref 3.5–5.3)
POTASSIUM SERPL-SCNC: 4 MMOL/L — SIGNIFICANT CHANGE UP (ref 3.5–5.3)
RBC # BLD: 4.27 M/UL — SIGNIFICANT CHANGE UP (ref 3.8–5.2)
RBC # FLD: 13 % — SIGNIFICANT CHANGE UP (ref 10.3–14.5)
RH IG SCN BLD-IMP: POSITIVE — SIGNIFICANT CHANGE UP
SODIUM SERPL-SCNC: 136 MMOL/L — SIGNIFICANT CHANGE UP (ref 135–145)
SODIUM SERPL-SCNC: 137 MMOL/L — SIGNIFICANT CHANGE UP (ref 135–145)
WBC # BLD: 9.85 K/UL — SIGNIFICANT CHANGE UP (ref 3.8–10.5)
WBC # FLD AUTO: 9.85 K/UL — SIGNIFICANT CHANGE UP (ref 3.8–10.5)

## 2020-11-02 PROCEDURE — 88342 IMHCHEM/IMCYTCHM 1ST ANTB: CPT | Mod: 26,59

## 2020-11-02 PROCEDURE — 61781 SCAN PROC CRANIAL INTRA: CPT

## 2020-11-02 PROCEDURE — 99291 CRITICAL CARE FIRST HOUR: CPT

## 2020-11-02 PROCEDURE — 88307 TISSUE EXAM BY PATHOLOGIST: CPT | Mod: 26

## 2020-11-02 PROCEDURE — 61608 RESECT/EXCISE CRANIAL LESION: CPT | Mod: 22

## 2020-11-02 PROCEDURE — 88341 IMHCHEM/IMCYTCHM EA ADD ANTB: CPT | Mod: 26,59

## 2020-11-02 PROCEDURE — 93308 TTE F-UP OR LMTD: CPT | Mod: 26

## 2020-11-02 PROCEDURE — 61592 ORBITOCRANIAL APPROACH/SKULL: CPT | Mod: LT

## 2020-11-02 PROCEDURE — 15750 NEUROVASCULAR PEDICLE FLAP: CPT

## 2020-11-02 PROCEDURE — 88360 TUMOR IMMUNOHISTOCHEM/MANUAL: CPT | Mod: 26

## 2020-11-02 PROCEDURE — 88331 PATH CONSLTJ SURG 1 BLK 1SPC: CPT | Mod: 26

## 2020-11-02 PROCEDURE — 88334 PATH CONSLTJ SURG CYTO XM EA: CPT | Mod: 26,59

## 2020-11-02 RX ORDER — GLUCAGON INJECTION, SOLUTION 0.5 MG/.1ML
1 INJECTION, SOLUTION SUBCUTANEOUS ONCE
Refills: 0 | Status: DISCONTINUED | OUTPATIENT
Start: 2020-11-02 | End: 2020-11-05

## 2020-11-02 RX ORDER — DEXTROSE 50 % IN WATER 50 %
12.5 SYRINGE (ML) INTRAVENOUS ONCE
Refills: 0 | Status: DISCONTINUED | OUTPATIENT
Start: 2020-11-02 | End: 2020-11-05

## 2020-11-02 RX ORDER — PANTOPRAZOLE SODIUM 20 MG/1
40 TABLET, DELAYED RELEASE ORAL DAILY
Refills: 0 | Status: DISCONTINUED | OUTPATIENT
Start: 2020-11-02 | End: 2020-11-02

## 2020-11-02 RX ORDER — PANTOPRAZOLE SODIUM 20 MG/1
40 TABLET, DELAYED RELEASE ORAL
Refills: 0 | Status: DISCONTINUED | OUTPATIENT
Start: 2020-11-03 | End: 2020-11-05

## 2020-11-02 RX ORDER — CARVEDILOL PHOSPHATE 80 MG/1
6.25 CAPSULE, EXTENDED RELEASE ORAL EVERY 12 HOURS
Refills: 0 | Status: DISCONTINUED | OUTPATIENT
Start: 2020-11-02 | End: 2020-11-05

## 2020-11-02 RX ORDER — DEXTROSE 50 % IN WATER 50 %
25 SYRINGE (ML) INTRAVENOUS ONCE
Refills: 0 | Status: DISCONTINUED | OUTPATIENT
Start: 2020-11-02 | End: 2020-11-05

## 2020-11-02 RX ORDER — CEFAZOLIN SODIUM 1 G
2000 VIAL (EA) INJECTION EVERY 8 HOURS
Refills: 0 | Status: COMPLETED | OUTPATIENT
Start: 2020-11-02 | End: 2020-11-03

## 2020-11-02 RX ORDER — ACETAMINOPHEN 500 MG
650 TABLET ORAL EVERY 6 HOURS
Refills: 0 | Status: DISCONTINUED | OUTPATIENT
Start: 2020-11-02 | End: 2020-11-05

## 2020-11-02 RX ORDER — AMLODIPINE BESYLATE 2.5 MG/1
10 TABLET ORAL DAILY
Refills: 0 | Status: DISCONTINUED | OUTPATIENT
Start: 2020-11-02 | End: 2020-11-05

## 2020-11-02 RX ORDER — DEXAMETHASONE 0.5 MG/5ML
4 ELIXIR ORAL EVERY 6 HOURS
Refills: 0 | Status: DISCONTINUED | OUTPATIENT
Start: 2020-11-02 | End: 2020-11-03

## 2020-11-02 RX ORDER — LISINOPRIL 2.5 MG/1
40 TABLET ORAL DAILY
Refills: 0 | Status: DISCONTINUED | OUTPATIENT
Start: 2020-11-02 | End: 2020-11-05

## 2020-11-02 RX ORDER — SODIUM CHLORIDE 9 MG/ML
1000 INJECTION, SOLUTION INTRAVENOUS
Refills: 0 | Status: DISCONTINUED | OUTPATIENT
Start: 2020-11-02 | End: 2020-11-05

## 2020-11-02 RX ORDER — DEXTROSE MONOHYDRATE, SODIUM CHLORIDE, AND POTASSIUM CHLORIDE 50; .745; 4.5 G/1000ML; G/1000ML; G/1000ML
1000 INJECTION, SOLUTION INTRAVENOUS
Refills: 0 | Status: DISCONTINUED | OUTPATIENT
Start: 2020-11-02 | End: 2020-11-02

## 2020-11-02 RX ORDER — KETOROLAC TROMETHAMINE 30 MG/ML
15 SYRINGE (ML) INJECTION EVERY 6 HOURS
Refills: 0 | Status: DISCONTINUED | OUTPATIENT
Start: 2020-11-02 | End: 2020-11-05

## 2020-11-02 RX ORDER — HYDRALAZINE HCL 50 MG
10 TABLET ORAL
Refills: 0 | Status: DISCONTINUED | OUTPATIENT
Start: 2020-11-02 | End: 2020-11-05

## 2020-11-02 RX ORDER — INSULIN LISPRO 100/ML
VIAL (ML) SUBCUTANEOUS
Refills: 0 | Status: DISCONTINUED | OUTPATIENT
Start: 2020-11-02 | End: 2020-11-03

## 2020-11-02 RX ORDER — ONDANSETRON 8 MG/1
4 TABLET, FILM COATED ORAL EVERY 6 HOURS
Refills: 0 | Status: DISCONTINUED | OUTPATIENT
Start: 2020-11-02 | End: 2020-11-05

## 2020-11-02 RX ORDER — LEVETIRACETAM 250 MG/1
500 TABLET, FILM COATED ORAL EVERY 12 HOURS
Refills: 0 | Status: DISCONTINUED | OUTPATIENT
Start: 2020-11-02 | End: 2020-11-05

## 2020-11-02 RX ORDER — SENNA PLUS 8.6 MG/1
2 TABLET ORAL AT BEDTIME
Refills: 0 | Status: DISCONTINUED | OUTPATIENT
Start: 2020-11-02 | End: 2020-11-05

## 2020-11-02 RX ORDER — DEXTROSE 50 % IN WATER 50 %
15 SYRINGE (ML) INTRAVENOUS ONCE
Refills: 0 | Status: DISCONTINUED | OUTPATIENT
Start: 2020-11-02 | End: 2020-11-05

## 2020-11-02 RX ORDER — SODIUM CHLORIDE 9 MG/ML
1000 INJECTION INTRAMUSCULAR; INTRAVENOUS; SUBCUTANEOUS
Refills: 0 | Status: DISCONTINUED | OUTPATIENT
Start: 2020-11-02 | End: 2020-11-03

## 2020-11-02 RX ORDER — HYDROMORPHONE HYDROCHLORIDE 2 MG/ML
0.25 INJECTION INTRAMUSCULAR; INTRAVENOUS; SUBCUTANEOUS ONCE
Refills: 0 | Status: DISCONTINUED | OUTPATIENT
Start: 2020-11-02 | End: 2020-11-02

## 2020-11-02 RX ADMIN — Medication 15 MILLIGRAM(S): at 18:30

## 2020-11-02 RX ADMIN — HYDROMORPHONE HYDROCHLORIDE 0.25 MILLIGRAM(S): 2 INJECTION INTRAMUSCULAR; INTRAVENOUS; SUBCUTANEOUS at 15:54

## 2020-11-02 RX ADMIN — Medication 10 MILLIGRAM(S): at 19:33

## 2020-11-02 RX ADMIN — Medication 100 MILLIGRAM(S): at 16:28

## 2020-11-02 RX ADMIN — LEVETIRACETAM 500 MILLIGRAM(S): 250 TABLET, FILM COATED ORAL at 17:04

## 2020-11-02 RX ADMIN — HYDROMORPHONE HYDROCHLORIDE 0.25 MILLIGRAM(S): 2 INJECTION INTRAMUSCULAR; INTRAVENOUS; SUBCUTANEOUS at 16:24

## 2020-11-02 RX ADMIN — Medication 4 MILLIGRAM(S): at 17:04

## 2020-11-02 RX ADMIN — Medication 15 MILLIGRAM(S): at 18:15

## 2020-11-02 RX ADMIN — Medication 4 MILLIGRAM(S): at 05:22

## 2020-11-02 RX ADMIN — Medication 10 MILLIGRAM(S): at 16:14

## 2020-11-02 RX ADMIN — Medication 650 MILLIGRAM(S): at 15:29

## 2020-11-02 RX ADMIN — LEVETIRACETAM 500 MILLIGRAM(S): 250 TABLET, FILM COATED ORAL at 05:19

## 2020-11-02 RX ADMIN — AMINOLEVULINIC ACID HYDROCHLORIDE 1500 MILLIGRAM(S): 1500 POWDER, FOR SOLUTION ORAL at 04:25

## 2020-11-02 RX ADMIN — LISINOPRIL 40 MILLIGRAM(S): 2.5 TABLET ORAL at 05:20

## 2020-11-02 RX ADMIN — CARVEDILOL PHOSPHATE 6.25 MILLIGRAM(S): 80 CAPSULE, EXTENDED RELEASE ORAL at 17:04

## 2020-11-02 RX ADMIN — Medication 1 APPLICATION(S): at 04:35

## 2020-11-02 RX ADMIN — CHLORHEXIDINE GLUCONATE 1 APPLICATION(S): 213 SOLUTION TOPICAL at 04:44

## 2020-11-02 RX ADMIN — Medication 650 MILLIGRAM(S): at 15:54

## 2020-11-02 RX ADMIN — SODIUM CHLORIDE 75 MILLILITER(S): 9 INJECTION INTRAMUSCULAR; INTRAVENOUS; SUBCUTANEOUS at 00:31

## 2020-11-02 RX ADMIN — PANTOPRAZOLE SODIUM 40 MILLIGRAM(S): 20 TABLET, DELAYED RELEASE ORAL at 05:23

## 2020-11-02 RX ADMIN — AMLODIPINE BESYLATE 10 MILLIGRAM(S): 2.5 TABLET ORAL at 05:19

## 2020-11-02 NOTE — BRIEF OPERATIVE NOTE - OPERATION/FINDINGS
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

## 2020-11-02 NOTE — PROGRESS NOTE ADULT - SUBJECTIVE AND OBJECTIVE BOX
Post op Note    Dx: left temporal brain mass    75y    Female   s/p left temporal crani for tumor resection earlier today without complication. Post op reutnre dto NSICU for osbevation. Denies any pain, weakness, blurry vision, n/v. Denies any CP, SOB or difficulty breathing.           T(C): 35.1 (11-02-20 @ 13:18), Max: 36.8 (11-01-20 @ 21:00)  HR: 54 (11-02-20 @ 13:34) (54 - 61)  BP: 117/58 (11-02-20 @ 13:34) (109/64 - 135/85)  RR: 18 (11-02-20 @ 13:34) (17 - 18)  SpO2: 98% (11-02-20 @ 13:34) (94% - 98%)  Wt(kg): --      Physical exam  Awake, alert, oriented x 3, PERRL, EOMI  Follows commands, aphasia (same as pre op)  HOPPER X4 with good strength   face symmetric  tongue midline  motor 5/5 throughout  neg drift  RRR  cta b/l  Incision: C/D/I  ERICKA intact

## 2020-11-02 NOTE — PROGRESS NOTE ADULT - ASSESSMENT
POD 0 s/p left crani and tumor resection neuro baseline  q1h neuero cehcks  q1h vitals  cont keppra ppx  tylenol prn   MRI tomorro  ERICKA to suction  cont dex 4q6h  ADAT  PPI  post op labs  SCDs  d/w Dr. Bosch and Yoel

## 2020-11-02 NOTE — PROVIDER CONTACT NOTE (OTHER) - SITUATION
DR Barclay made aware that pt needed type and screen previous blood drawn was discarde by lab since the specimen was not signed.

## 2020-11-02 NOTE — BRIEF OPERATIVE NOTE - NSICDXBRIEFPROCEDURE_GEN_ALL_CORE_FT
PROCEDURES:  Craniotomy for resection of tumor of left side of brain 02-Nov-2020 12:16:21  Armando Smith

## 2020-11-02 NOTE — PROGRESS NOTE ADULT - ASSESSMENT
75F w/ aphasia w/ L temporal mass, L s/p left crani and tumor resection neuro baseline  q1h neuero cehcks  q1h vitals  cont keppra ppx  tylenol prn   MRI tomorro  ERICKA to suction  cont dex 4q6h  ADAT  PPI  post op labs  SCDs  d/w Dr. Bosch

## 2020-11-02 NOTE — PROGRESS NOTE ADULT - SUBJECTIVE AND OBJECTIVE BOX
NEUROCRITICAL CARE PROGRESS NOTE    YORDY FLORES   [id number]  Summary:  /  HPI:  75 y.o. female with HTN presents today after discharge from hospital in [place], sent by Dr. Bosch for findings of large brain mass on CT/MR of the brain. Patient and her son at bedside report several months of worsening word finding difficulty, as well as reading and writing. Patient denies any headaches, visual/hearing changes, extremity weakness or numbness, gait disturbance, syncope or seizures. She denies any constitutional symptoms such as weight loss, generalized fatigue or anorexia. Patient comes with CDs with CT and MR. She denies use of Aspirin, or other anticoagulation. Patient was started on Keppra and Decadron in previous facility. (29 Oct 2020 19:05)      Vital Signs Last 24 Hrs  T(C): 36.4 (02 Nov 2020 17:28), Max: 36.8 (01 Nov 2020 21:00)  T(F): 97.5 (02 Nov 2020 17:28), Max: 98.2 (01 Nov 2020 21:00)  HR: 65 (02 Nov 2020 19:00) (52 - 65)  BP: 132/62 (02 Nov 2020 19:00) (109/64 - 161/69)  BP(mean): 89 (02 Nov 2020 19:00) (68 - 99)  RR: 17 (02 Nov 2020 19:00) (14 - 18)  SpO2: 96% (02 Nov 2020 19:00) (94% - 98%)        I&O's Detail    02 Nov 2020 07:01  -  02 Nov 2020 19:35  --------------------------------------------------------  IN:    IV PiggyBack: 50 mL    sodium chloride 0.9%: 600 mL  Total IN: 650 mL    OUT:    Bulb (mL): 170 mL    Indwelling Catheter - Urethral (mL): 1195 mL  Total OUT: 1365 mL    Total NET: -715 mL          LABS:                        12.6   9.85  )-----------( 166      ( 02 Nov 2020 13:30 )             37.7     11-02    136  |  103  |  12  ----------------------------<  187<H>  3.9   |  23  |  0.50    Ca    8.6      02 Nov 2020 13:30  Phos  4.1     11-02  Mg     2.5     11-02              CAPILLARY BLOOD GLUCOSE      POCT Blood Glucose.: 128 mg/dL (02 Nov 2020 16:29)  POCT Blood Glucose.: 137 mg/dL (02 Nov 2020 04:05)  POCT Blood Glucose.: 181 mg/dL (01 Nov 2020 21:54)      Drug Levels: [] N/A    CSF Analysis: [] N/A      Allergies    codeine (Unknown)  sulfa drugs (Unknown)    Intolerances      MEDICATIONS:  Antibiotics:  ceFAZolin   IVPB 2000 milliGRAM(s) IV Intermittent every 8 hours    Neuro:  acetaminophen   Tablet .. 650 milliGRAM(s) Oral every 6 hours PRN  ketorolac   Injectable 15 milliGRAM(s) IV Push every 6 hours PRN  levETIRAcetam 500 milliGRAM(s) Oral every 12 hours  ondansetron Injectable 4 milliGRAM(s) IV Push every 6 hours PRN    Anticoagulation:    OTHER:  amLODIPine   Tablet 10 milliGRAM(s) Oral daily  bisacodyl 5 milliGRAM(s) Oral daily PRN  carvedilol 6.25 milliGRAM(s) Oral every 12 hours  dexAMETHasone  Injectable 4 milliGRAM(s) IV Push every 6 hours  dextrose 40% Gel 15 Gram(s) Oral once PRN  dextrose 50% Injectable 12.5 Gram(s) IV Push once  dextrose 50% Injectable 25 Gram(s) IV Push once  dextrose 50% Injectable 25 Gram(s) IV Push once  glucagon  Injectable 1 milliGRAM(s) IntraMuscular once PRN  hydrALAZINE Injectable 10 milliGRAM(s) IV Push every 3 hours PRN  insulin lispro (ADMELOG) corrective regimen sliding scale   SubCutaneous Before meals and at bedtime  lisinopril 40 milliGRAM(s) Oral daily  senna 2 Tablet(s) Oral at bedtime PRN    IVF:  dextrose 5%. 1000 milliLiter(s) IV Continuous <Continuous>  sodium chloride 0.9%. 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 However, peripheral restricted diffusion and hyperdensity on prior CT head 10/26/2020 and therefore CNS lymphoma cannot beexcluded. There is enhancement extending to the ependymal lining of the left lateral ventricle.    Mass effect on the left lateral ventricle, basal ganglia and thalamus with mild left to right midline shift which is not significantly changed from prior examination.            Physical exam  Awake, alert, oriented x 3, PERRL, EOMI  Follows commands, aphasia (same as pre op)  HOPPER X4 with good strength   face symmetric  tongue midline  motor 5/5 throughout  neg drift  RRR  cta b/l  Incision: C/D/I  ERICKA intact

## 2020-11-03 LAB
ANION GAP SERPL CALC-SCNC: 9 MMOL/L — SIGNIFICANT CHANGE UP (ref 5–17)
BUN SERPL-MCNC: 9 MG/DL — SIGNIFICANT CHANGE UP (ref 7–23)
CALCIUM SERPL-MCNC: 8.2 MG/DL — LOW (ref 8.4–10.5)
CHLORIDE SERPL-SCNC: 108 MMOL/L — SIGNIFICANT CHANGE UP (ref 96–108)
CLOSURE TME COLL+EPINEP BLD: 160 K/UL — SIGNIFICANT CHANGE UP (ref 150–400)
CO2 SERPL-SCNC: 23 MMOL/L — SIGNIFICANT CHANGE UP (ref 22–31)
CREAT SERPL-MCNC: 0.46 MG/DL — LOW (ref 0.5–1.3)
GLUCOSE BLDC GLUCOMTR-MCNC: 103 MG/DL — HIGH (ref 70–99)
GLUCOSE BLDC GLUCOMTR-MCNC: 120 MG/DL — HIGH (ref 70–99)
GLUCOSE SERPL-MCNC: 121 MG/DL — HIGH (ref 70–99)
HCT VFR BLD CALC: 38.2 % — SIGNIFICANT CHANGE UP (ref 34.5–45)
HGB BLD-MCNC: 12.4 G/DL — SIGNIFICANT CHANGE UP (ref 11.5–15.5)
MAGNESIUM SERPL-MCNC: 2.3 MG/DL — SIGNIFICANT CHANGE UP (ref 1.6–2.6)
MCHC RBC-ENTMCNC: 29.5 PG — SIGNIFICANT CHANGE UP (ref 27–34)
MCHC RBC-ENTMCNC: 32.5 GM/DL — SIGNIFICANT CHANGE UP (ref 32–36)
MCV RBC AUTO: 91 FL — SIGNIFICANT CHANGE UP (ref 80–100)
NRBC # BLD: 0 /100 WBCS — SIGNIFICANT CHANGE UP (ref 0–0)
PHOSPHATE SERPL-MCNC: 4 MG/DL — SIGNIFICANT CHANGE UP (ref 2.5–4.5)
PLATELET # BLD AUTO: 145 K/UL — LOW (ref 150–400)
POTASSIUM SERPL-MCNC: 3.7 MMOL/L — SIGNIFICANT CHANGE UP (ref 3.5–5.3)
POTASSIUM SERPL-SCNC: 3.7 MMOL/L — SIGNIFICANT CHANGE UP (ref 3.5–5.3)
RBC # BLD: 4.2 M/UL — SIGNIFICANT CHANGE UP (ref 3.8–5.2)
RBC # FLD: 12.9 % — SIGNIFICANT CHANGE UP (ref 10.3–14.5)
SODIUM SERPL-SCNC: 140 MMOL/L — SIGNIFICANT CHANGE UP (ref 135–145)
WBC # BLD: 10.71 K/UL — HIGH (ref 3.8–10.5)
WBC # FLD AUTO: 10.71 K/UL — HIGH (ref 3.8–10.5)

## 2020-11-03 PROCEDURE — 99233 SBSQ HOSP IP/OBS HIGH 50: CPT

## 2020-11-03 PROCEDURE — 99232 SBSQ HOSP IP/OBS MODERATE 35: CPT | Mod: GC

## 2020-11-03 RX ORDER — POTASSIUM CHLORIDE 20 MEQ
20 PACKET (EA) ORAL
Refills: 0 | Status: COMPLETED | OUTPATIENT
Start: 2020-11-03 | End: 2020-11-03

## 2020-11-03 RX ORDER — DEXAMETHASONE 0.5 MG/5ML
4 ELIXIR ORAL EVERY 6 HOURS
Refills: 0 | Status: COMPLETED | OUTPATIENT
Start: 2020-11-03 | End: 2020-11-04

## 2020-11-03 RX ORDER — DEXAMETHASONE 0.5 MG/5ML
ELIXIR ORAL
Refills: 0 | Status: DISCONTINUED | OUTPATIENT
Start: 2020-11-03 | End: 2020-11-05

## 2020-11-03 RX ORDER — DEXAMETHASONE 0.5 MG/5ML
2 ELIXIR ORAL EVERY 12 HOURS
Refills: 0 | Status: CANCELLED | OUTPATIENT
Start: 2020-11-08 | End: 2020-11-05

## 2020-11-03 RX ORDER — DEXAMETHASONE 0.5 MG/5ML
2 ELIXIR ORAL EVERY 8 HOURS
Refills: 0 | Status: DISCONTINUED | OUTPATIENT
Start: 2020-11-06 | End: 2020-11-05

## 2020-11-03 RX ORDER — DEXAMETHASONE 0.5 MG/5ML
2 ELIXIR ORAL EVERY 12 HOURS
Refills: 0 | Status: CANCELLED | OUTPATIENT
Start: 2020-11-10 | End: 2020-11-05

## 2020-11-03 RX ORDER — DEXAMETHASONE 0.5 MG/5ML
4 ELIXIR ORAL EVERY 8 HOURS
Refills: 0 | Status: DISCONTINUED | OUTPATIENT
Start: 2020-11-04 | End: 2020-11-05

## 2020-11-03 RX ORDER — CHLORHEXIDINE GLUCONATE 213 G/1000ML
1 SOLUTION TOPICAL ONCE
Refills: 0 | Status: COMPLETED | OUTPATIENT
Start: 2020-11-03 | End: 2020-11-03

## 2020-11-03 RX ADMIN — CARVEDILOL PHOSPHATE 6.25 MILLIGRAM(S): 80 CAPSULE, EXTENDED RELEASE ORAL at 17:17

## 2020-11-03 RX ADMIN — Medication 20 MILLIEQUIVALENT(S): at 10:30

## 2020-11-03 RX ADMIN — LISINOPRIL 40 MILLIGRAM(S): 2.5 TABLET ORAL at 06:31

## 2020-11-03 RX ADMIN — Medication 15 MILLIGRAM(S): at 03:15

## 2020-11-03 RX ADMIN — Medication 20 MILLIEQUIVALENT(S): at 12:07

## 2020-11-03 RX ADMIN — LEVETIRACETAM 500 MILLIGRAM(S): 250 TABLET, FILM COATED ORAL at 17:17

## 2020-11-03 RX ADMIN — Medication 4 MILLIGRAM(S): at 00:09

## 2020-11-03 RX ADMIN — Medication 4 MILLIGRAM(S): at 06:28

## 2020-11-03 RX ADMIN — LEVETIRACETAM 500 MILLIGRAM(S): 250 TABLET, FILM COATED ORAL at 06:28

## 2020-11-03 RX ADMIN — Medication 15 MILLIGRAM(S): at 06:30

## 2020-11-03 RX ADMIN — PANTOPRAZOLE SODIUM 40 MILLIGRAM(S): 20 TABLET, DELAYED RELEASE ORAL at 06:28

## 2020-11-03 RX ADMIN — CARVEDILOL PHOSPHATE 6.25 MILLIGRAM(S): 80 CAPSULE, EXTENDED RELEASE ORAL at 06:28

## 2020-11-03 RX ADMIN — SENNA PLUS 2 TABLET(S): 8.6 TABLET ORAL at 21:54

## 2020-11-03 RX ADMIN — Medication 100 MILLIGRAM(S): at 00:09

## 2020-11-03 RX ADMIN — Medication 10 MILLIGRAM(S): at 03:15

## 2020-11-03 RX ADMIN — CHLORHEXIDINE GLUCONATE 1 APPLICATION(S): 213 SOLUTION TOPICAL at 05:45

## 2020-11-03 RX ADMIN — Medication 4 MILLIGRAM(S): at 12:07

## 2020-11-03 RX ADMIN — AMLODIPINE BESYLATE 10 MILLIGRAM(S): 2.5 TABLET ORAL at 06:28

## 2020-11-03 RX ADMIN — Medication 4 MILLIGRAM(S): at 17:17

## 2020-11-03 NOTE — PROGRESS NOTE ADULT - SUBJECTIVE AND OBJECTIVE BOX
Interval Events: Reviewed  Patient seen and examined at bedside.    Patient is a 75y old  Female who presents with a chief complaint of Brain Tumor (04 Nov 2020 15:51)    she is doing OK  PAST MEDICAL & SURGICAL HISTORY:  Brain tumor    HTN (hypertension)    No significant past surgical history        MEDICATIONS:  Pulmonary:    Antimicrobials:    Anticoagulants:  enoxaparin Injectable 40 milliGRAM(s) SubCutaneous <User Schedule>    Cardiac:  amLODIPine   Tablet 10 milliGRAM(s) Oral daily  carvedilol 6.25 milliGRAM(s) Oral every 12 hours  hydrALAZINE Injectable 10 milliGRAM(s) IV Push every 3 hours PRN  lisinopril 40 milliGRAM(s) Oral daily      Allergies    codeine (Unknown)  sulfa drugs (Unknown)    Intolerances        Vital Signs Last 24 Hrs  T(C): 37.1 (04 Nov 2020 20:43), Max: 37.1 (04 Nov 2020 01:12)  T(F): 98.7 (04 Nov 2020 20:43), Max: 98.8 (04 Nov 2020 01:12)  HR: 66 (04 Nov 2020 20:43) (57 - 67)  BP: 111/71 (04 Nov 2020 20:43) (103/63 - 155/93)  BP(mean): --  RR: 17 (04 Nov 2020 20:43) (16 - 21)  SpO2: 99% (04 Nov 2020 20:43) (94% - 99%)    11-03 @ 07:01  -  11-04 @ 07:00  --------------------------------------------------------  IN: 1700 mL / OUT: 902 mL / NET: 798 mL    11-04 @ 07:01  -  11-04 @ 21:56  --------------------------------------------------------  IN: 120 mL / OUT: 30 mL / NET: 90 mL          Review of Systems:   •	General: negative  •	Skin/Breast: negative  •	Ophthalmologic: negative  •	ENMT: negative  •	Respiratory and Thorax: negative  •	Cardiovascular: negative  •	Gastrointestinal: negative  •	Genitourinary: negative  •	Musculoskeletal: negative  •	Neurological: negative  •	Psychiatric: negative  •	Hematology/Lymphatics: negative  •	Endocrine: negative  •	Allergic/Immunologic: negative    Physical Exam:   • Constitutional:	Well-developed, well nourished  • Eyes:	EOMI; PERRL; no drainage or redness  • ENMT:	No oral lesions; no gross abnormalities  • Neck	No bruits; no thyromegaly or nodules  • Breasts:	not examined  • Back:	No deformity or limitation of movement  • Respiratory:	Breath Sounds equal & clear to percussion & auscultation, no accessory muscle use  • Cardiovascular:	Regular rate & rhythm, normal S1, S2; no murmurs, gallops or rubs; no S3, S4  • Gastrointestinal:	Soft, non-tender, no hepatosplenomegaly, normal bowel sounds  • Genitourinary:	not examined  • Rectal: not examined  • Extremities:	No cyanosis, clubbing or edema  • Vascular:	Equal and normal pulses (carotid, femoral, dorsalis pedis)  • Neurologica:l	not examined  • Skin:	No lesions; no rash  • Lymph Nodes:	No lymphadedenopathy  • Musculoskeletal:	No joint pain, swelling or deformity; no limitation of movement        LABS:      CBC Full  -  ( 04 Nov 2020 07:42 )  WBC Count : 9.24 K/uL  RBC Count : 4.39 M/uL  Hemoglobin : 12.9 g/dL  Hematocrit : 40.0 %  Platelet Count - Automated : 153 K/uL  Mean Cell Volume : 91.1 fl  Mean Cell Hemoglobin : 29.4 pg  Mean Cell Hemoglobin Concentration : 32.3 gm/dL  Auto Neutrophil # : x  Auto Lymphocyte # : x  Auto Monocyte # : x  Auto Eosinophil # : x  Auto Basophil # : x  Auto Neutrophil % : x  Auto Lymphocyte % : x  Auto Monocyte % : x  Auto Eosinophil % : x  Auto Basophil % : x    11-04    140  |  108  |  15  ----------------------------<  147<H>  4.4   |  23  |  0.55    Ca    8.4      04 Nov 2020 07:42  Phos  3.9     11-04  Mg     2.4     11-04                          RADIOLOGY & ADDITIONAL STUDIES (The following images were personally reviewed):  [place]:                                     No  Urine output:                       adequate  DVT prophylaxis:                 Yes  Flattus:                                  Yes  Bowel movement:              No

## 2020-11-03 NOTE — PROGRESS NOTE ADULT - ASSESSMENT
75F w/ aphasia w/ L temporal mass, L s/p left crani and tumor resection neuro baseline  q1h neuero cehcks  q1h vitals  cont keppra ppx  tylenol prn   MRI tomorro  ERICKA to suction  cont dex 4q6h  ADAT  PPI  post op labs  SCDs  d/w Dr. Bosch   SDU

## 2020-11-03 NOTE — DIETITIAN INITIAL EVALUATION ADULT. - ADD RECOMMEND
1. Monitor need to change diet to CSTCHO as pt on Decadron 1. Monitor need to change diet to CSTCHO as pt on Decadron 2. Monitor need for ONS pending PO intake

## 2020-11-03 NOTE — DIETITIAN INITIAL EVALUATION ADULT. - OTHER INFO
75 year old female with hx HTN, large left temporal brain mass with worsening expressive aphasia now s/p L temporal crani for tumor resection (11/2/20), frozen high grade malignant neoplasm. Normonatremia goal, current Na level= 140. MAP 76. Satting 93-95% on room air at this time.     Pt seen resting in bed, difficult to obtain all information from pt 2/2 aphasia. Pt denies pain, N/V at this time. Last BM yesterday per pt but last documented BM on 10/30 per flow sheet (ordered for dulcolax and senna). Oscar score 17.  Pt only consumed ~25% of breakfast this AM. Pt states she has a good appetite PTA, follows a regular diet, denies food allergies. Pt states she lost weight but unable to verbalize how much and time frame. When asked if the weight loss was intentional, pt states yes. No muscle/fat loss noted on physical exam. Of note, pt on Decadron (A1c WNL, BG levels/POCT controlled at this time). Please see below for full nutritional recommendations- d/w team. RD to monitor and f/u per protocol.

## 2020-11-03 NOTE — PHYSICAL THERAPY INITIAL EVALUATION ADULT - PERTINENT HX OF CURRENT PROBLEM, REHAB EVAL
75 y.o. female with HTN presents today after discharge from hospital in [place], sent by Dr. Bosch for findings of large brain mass on CT/MR of the brain. Patient and her son at bedside report several months of worsening word finding difficulty, as well as reading and writing. Patient denies any headaches, visual/hearing changes, extremity weakness or numbness, gait disturbance, syncope or seizures.

## 2020-11-03 NOTE — PHYSICAL THERAPY INITIAL EVALUATION ADULT - IMPAIRMENTS FOUND, PT EVAL
ergonomics and body mechanics/muscle strength/poor safety awareness/posture/gait, locomotion, and balance

## 2020-11-03 NOTE — DIETITIAN INITIAL EVALUATION ADULT. - OTHER CALCULATIONS
IBW used to calculate energy needs due to pt's current body weight exceeding 120% of IBW (127%). Needs adjusted for age, post-op.

## 2020-11-03 NOTE — PROGRESS NOTE ADULT - SUBJECTIVE AND OBJECTIVE BOX
HPI:  75 y.o. female with HTN presents today after discharge from hospital in [place], sent by Dr. Bosch for findings of large brain mass on CT/MR of the brain. Patient and her son at bedside report several months of worsening word finding difficulty, as well as reading and writing. Patient denies any headaches, visual/hearing changes, extremity weakness or numbness, gait disturbance, syncope or seizures. She denies any constitutional symptoms such as weight loss, generalized fatigue or anorexia. Patient comes with CDs with CT and MR. She denies use of Aspirin, or other anticoagulation. Patient was started on Keppra and Decadron in previous facility. (29 Oct 2020 19:05)    OVERNIGHT EVENTS: LORENZA o/n, neuro stable    Hospital Course:   10/29: admitted to [facility] for brain tumor w/u. dopplers negative  10/30: LORENZA o/n, neuro stable. pending MR nelson, pre op monday 11/1: LORENZA o/n, improved aphasia, on decadron. preop for monday. MR nelson done. med clearance pending   11/2: POD0 L temporal crani for tumor resection. frozen: high grade malignant neoplasm. SG JPx1.   11/3: POD1. LORENZA o/n, neuro stable. passed TOV.     Vital Signs Last 24 Hrs  T(C): 37.2 (02 Nov 2020 21:45), Max: 37.2 (02 Nov 2020 21:45)  T(F): 98.9 (02 Nov 2020 21:45), Max: 98.9 (02 Nov 2020 21:45)  HR: 57 (03 Nov 2020 01:00) (52 - 72)  BP: 128/68 (03 Nov 2020 01:00) (109/64 - 161/69)  BP(mean): 92 (03 Nov 2020 01:00) (68 - 99)  RR: 17 (02 Nov 2020 23:00) (14 - 18)  SpO2: 91% (03 Nov 2020 01:00) (91% - 98%)    I&O's Summary    02 Nov 2020 07:01  -  03 Nov 2020 01:41  --------------------------------------------------------  IN: 875 mL / OUT: 2365 mL / NET: -1490 mL        PHYSICAL EXAM:  GEN: laying in bed, appears well, NAD  NEURO: AOx3. +exp aphasia. FC, OE spont, +mild R facial. CNII-XII intact. PERRL, EOMI. +RUE pronator drift. RUE 4+/5, o/w 5/5 strenght throughout. SILT  CV: RRR +S1/S2  PULM: CTAB  GI: Abd soft, NT/ND  EXT: ext warm, dry, nontender  WOUND: crani site c/d/i    TUBES/LINES:  [] Gant  [] Lumbar Drain  [x] Wound Drains: SG JPx1  [] Others      DIET:  [] NPO  [x] Mechanical  [] Tube feeds    LABS:                        12.6   9.85  )-----------( 166      ( 02 Nov 2020 13:30 )             37.7     11-02    137  |  103  |  9   ----------------------------<  132<H>  4.0   |  24  |  0.43<L>    Ca    8.8      02 Nov 2020 21:47  Phos  4.1     11-02  Mg     2.5     11-02              CAPILLARY BLOOD GLUCOSE      POCT Blood Glucose.: 112 mg/dL (02 Nov 2020 21:34)  POCT Blood Glucose.: 128 mg/dL (02 Nov 2020 16:29)  POCT Blood Glucose.: 137 mg/dL (02 Nov 2020 04:05)      Drug Levels: [] N/A    CSF Analysis: [] N/A      Allergies    codeine (Unknown)  sulfa drugs (Unknown)    Intolerances      MEDICATIONS:  Antibiotics:    Neuro:  acetaminophen   Tablet .. 650 milliGRAM(s) Oral every 6 hours PRN  ketorolac   Injectable 15 milliGRAM(s) IV Push every 6 hours PRN  levETIRAcetam 500 milliGRAM(s) Oral every 12 hours  ondansetron Injectable 4 milliGRAM(s) IV Push every 6 hours PRN    Anticoagulation:    OTHER:  amLODIPine   Tablet 10 milliGRAM(s) Oral daily  bisacodyl 5 milliGRAM(s) Oral daily PRN  carvedilol 6.25 milliGRAM(s) Oral every 12 hours  dexAMETHasone  Injectable 4 milliGRAM(s) IV Push every 6 hours  dextrose 40% Gel 15 Gram(s) Oral once PRN  dextrose 50% Injectable 12.5 Gram(s) IV Push once  dextrose 50% Injectable 25 Gram(s) IV Push once  dextrose 50% Injectable 25 Gram(s) IV Push once  glucagon  Injectable 1 milliGRAM(s) IntraMuscular once PRN  hydrALAZINE Injectable 10 milliGRAM(s) IV Push every 3 hours PRN  insulin lispro (ADMELOG) corrective regimen sliding scale   SubCutaneous Before meals and at bedtime  lisinopril 40 milliGRAM(s) Oral daily  pantoprazole    Tablet 40 milliGRAM(s) Oral before breakfast  senna 2 Tablet(s) Oral at bedtime PRN    IVF:  dextrose 5%. 1000 milliLiter(s) IV Continuous <Continuous>  sodium chloride 0.9%. 1000 milliLiter(s) IV Continuous <Continuous>    CULTURES:    RADIOLOGY & ADDITIONAL TESTS:      ASSESSMENT:  75 year old female with HTN, large left temporal brain mass with worsening expressive aphasia now s/p L temporal crani for tumor resection (11/2/20), frozen high grade malignant neoplasm    BRAIN TUMOR    No pertinent family history in first degree relatives    Handoff    MEWS Score    Brain tumor    HTN (hypertension)    Brain tumor    Brain tumor    Brain tumor    Preop exam for internal medicine    HTN (hypertension)    Brain tumor    Essential hypertension    Craniotomy for resection of tumor of left side of brain    No significant past surgical history    MED EVAL    [email or web address]        PLAN:  NEURO:  - neuro checks q1h  - pain control w/ toradol   - keppra 500 bid  - decadron 4q6  - PT/OT    CARDIOVASCULAR:  - normotensive SBP goal   - cont home coreg, lisinopril, norvasc    PULMONARY:  - IS  - satting well RA    RENAL:  - repletions prn   - gant d/c yesterday, passed TOV  - normonatremia goal    GI:  - regular diet  - bowel regimen  - gi ppx protonix    HEME:  - h/h stable  - SCDs    ID:  - afebrile  - no white count    ENDO:  - glucose goal 140-180  - ISS    DVT PROPHYLAXIS:  [x] Venodynes                                [] Heparin/Lovenox    DISPOSITION: ICU status, full code, dispo pending    D/w Dr. Bosch and Dr. Diallo    Assessment:  Present when checked    []  GCS  E   V  M     Heart Failure: []Acute, [] acute on chronic , []chronic  Heart Failure:  [] Diastolic (HFpEF), [] Systolic (HFrEF), []Combined (HFpEF and HFrEF), [] RHF, [] Pulm HTN, [] Other    [] REYNA, [] ATN, [] AIN, [] other  [] CKD1, [] CKD2, [] CKD 3, [] CKD 4, [] CKD 5, []ESRD    Encephalopathy: [] Metabolic, [] Hepatic, [] toxic, [] Neurological, [] Other    Abnormal Nurtitional Status: [] malnurtition (see nutrition note), [ ]underweight: BMI < 19, [] morbid obesity: BMI >40, [] Cachexia    [] Sepsis  [] hypovolemic shock,[] cardiogenic shock, [] hemorrhagic shock, [] neuogenic shock  [] Acute Respiratory Failure  []Cerebral edema, [] Brain compression/ herniation,   [] Functional quadriplegia  [] Acute blood loss anemia

## 2020-11-03 NOTE — OCCUPATIONAL THERAPY INITIAL EVALUATION ADULT - PLANNED THERAPY INTERVENTIONS, OT EVAL
bed mobility training/cognitive, visual perceptual/neuromuscular re-education/ADL retraining/balance training/motor coordination training/parent/caregiver training.../transfer training

## 2020-11-03 NOTE — OCCUPATIONAL THERAPY INITIAL EVALUATION ADULT - ORIENTATION, REHAB EVAL
provided multiple choices which pt was able to indicate through visual/tactile cues due to deficits in speech-language/oriented to person, place, time and situation

## 2020-11-03 NOTE — DIETITIAN INITIAL EVALUATION ADULT. - PERSON TAUGHT/METHOD
patient instructed/encouraged increased PO intake with emphasis on lean protein for healing post-op; f/u for additional education/pt appeared receptive/verbal instruction

## 2020-11-03 NOTE — PROGRESS NOTE ADULT - PROBLEM SELECTOR PLAN 1
Neurosurgery is following   The patient is stable postoperatively.  She is on Decadron and antiseizure.  Follow-up on pathology

## 2020-11-03 NOTE — PROGRESS NOTE ADULT - SUBJECTIVE AND OBJECTIVE BOX
NEUROCRITICAL CARE PROGRESS NOTE    YORDY FLORES   MRN-[id number]  Summary:  /  HPI:  75 y.o. female with HTN presents today after discharge from hospital in [place], sent by Dr. Bosch for findings of large brain mass on CT/MR of the brain. Patient and her son at bedside report several months of worsening word finding difficulty, as well as reading and writing. Patient denies any headaches, visual/hearing changes, extremity weakness or numbness, gait disturbance, syncope or seizures. She denies any constitutional symptoms such as weight loss, generalized fatigue or anorexia. Patient comes with CDs with CT and MR. She denies use of Aspirin, or other anticoagulation. Patient was started on Keppra and Decadron in previous facility. (29 Oct 2020 19:05)      Overnight Events:       PHYSICAL EXAMINATION  T(C): 36.6 (11-03 @ 01:11), Max: 37.2 (11-02 @ 21:45)  HR: 62 (11-03 @ 08:00) (52 - 72)  BP: 136/76 (11-03 @ 08:00) (109/64 - 162/78)  RR: 17 (11-03 @ 08:00) (14 - 19)  SpO2: 86% (11-03 @ 08:00) (86% - 98%)  CVP(mm Hg): --    LABS:  CAPILLARY BLOOD GLUCOSE      POCT Blood Glucose.: 103 mg/dL (03 Nov 2020 06:48)  POCT Blood Glucose.: 112 mg/dL (02 Nov 2020 21:34)  POCT Blood Glucose.: 128 mg/dL (02 Nov 2020 16:29)                          12.4   10.71 )-----------( 145      ( 03 Nov 2020 05:02 )             38.2     11-03    140  |  108  |  9   ----------------------------<  121<H>  3.7   |  23  |  0.46<L>    Ca    8.2<L>      03 Nov 2020 05:02  Phos  4.0     11-03  Mg     2.3     11-03 11-02 @ 07:01  -  11-03 @ 07:00  --------------------------------------------------------  IN: 1325 mL / OUT: 2805 mL / NET: -1480 mL        MEDICATIONS:  MEDICATIONS  (STANDING):  amLODIPine   Tablet 10 milliGRAM(s) Oral daily  carvedilol 6.25 milliGRAM(s) Oral every 12 hours  dexAMETHasone  Injectable 4 milliGRAM(s) IV Push every 6 hours  dextrose 5%. 1000 milliLiter(s) (50 mL/Hr) IV Continuous <Continuous>  dextrose 50% Injectable 12.5 Gram(s) IV Push once  dextrose 50% Injectable 25 Gram(s) IV Push once  dextrose 50% Injectable 25 Gram(s) IV Push once  insulin lispro (ADMELOG) corrective regimen sliding scale   SubCutaneous Before meals and at bedtime  levETIRAcetam 500 milliGRAM(s) Oral every 12 hours  lisinopril 40 milliGRAM(s) Oral daily  pantoprazole    Tablet 40 milliGRAM(s) Oral before breakfast  potassium chloride    Tablet ER 20 milliEquivalent(s) Oral every 2 hours    MEDICATIONS  (PRN):  acetaminophen   Tablet .. 650 milliGRAM(s) Oral every 6 hours PRN Temp greater or equal to 38C (100.4F), Mild Pain (1 - 3)  bisacodyl 5 milliGRAM(s) Oral daily PRN Constipation  dextrose 40% Gel 15 Gram(s) Oral once PRN Blood Glucose LESS THAN 70 milliGRAM(s)/deciliter  glucagon  Injectable 1 milliGRAM(s) IntraMuscular once PRN Glucose LESS THAN 70 milligrams/deciliter  hydrALAZINE Injectable 10 milliGRAM(s) IV Push every 3 hours PRN SBP>140  ketorolac   Injectable 15 milliGRAM(s) IV Push every 6 hours PRN Severe Pain (7 - 10)  ondansetron Injectable 4 milliGRAM(s) IV Push every 6 hours PRN Nausea and/or Vomiting  senna 2 Tablet(s) Oral at bedtime PRN Constipation        m< from: MR Brain Stereotactic w/wo IV Cont (10.31.20 @ 12:19) >  5.2 cm left temporal lobe mass which demonstrates central necrosis/cystic changes and subacute blood products and peripheral enhancement with hyperperfusion and increased vascularity, 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 However, peripheral restricted diffusion and hyperdensity on prior CT head 10/26/2020 and therefore CNS lymphoma cannot beexcluded. There is enhancement extending to the ependymal lining of the left lateral ventricle.    Mass effect on the left lateral ventricle, basal ganglia and thalamus with mild left to right midline shift which is not significantly changed from prior examination.            Physical exam  Awake, alert, oriented x 3, PERRL, EOMI  Follows commands, aphasia (same as pre op)  HOPPER X4 with good strength   face symmetric  tongue midline  motor 5/5 throughout  neg drift  RRR  cta b/l  Incision: C/D/I  ERICKA intact

## 2020-11-04 ENCOUNTER — TRANSCRIPTION ENCOUNTER (OUTPATIENT)
Age: 75
End: 2020-11-04

## 2020-11-04 DIAGNOSIS — Z98.890 OTHER SPECIFIED POSTPROCEDURAL STATES: ICD-10-CM

## 2020-11-04 DIAGNOSIS — D62 ACUTE POSTHEMORRHAGIC ANEMIA: ICD-10-CM

## 2020-11-04 LAB
ANION GAP SERPL CALC-SCNC: 9 MMOL/L — SIGNIFICANT CHANGE UP (ref 5–17)
BUN SERPL-MCNC: 15 MG/DL — SIGNIFICANT CHANGE UP (ref 7–23)
CALCIUM SERPL-MCNC: 8.4 MG/DL — SIGNIFICANT CHANGE UP (ref 8.4–10.5)
CHLORIDE SERPL-SCNC: 108 MMOL/L — SIGNIFICANT CHANGE UP (ref 96–108)
CO2 SERPL-SCNC: 23 MMOL/L — SIGNIFICANT CHANGE UP (ref 22–31)
CREAT SERPL-MCNC: 0.55 MG/DL — SIGNIFICANT CHANGE UP (ref 0.5–1.3)
GLUCOSE SERPL-MCNC: 147 MG/DL — HIGH (ref 70–99)
HCT VFR BLD CALC: 40 % — SIGNIFICANT CHANGE UP (ref 34.5–45)
HGB BLD-MCNC: 12.9 G/DL — SIGNIFICANT CHANGE UP (ref 11.5–15.5)
MAGNESIUM SERPL-MCNC: 2.4 MG/DL — SIGNIFICANT CHANGE UP (ref 1.6–2.6)
MCHC RBC-ENTMCNC: 29.4 PG — SIGNIFICANT CHANGE UP (ref 27–34)
MCHC RBC-ENTMCNC: 32.3 GM/DL — SIGNIFICANT CHANGE UP (ref 32–36)
MCV RBC AUTO: 91.1 FL — SIGNIFICANT CHANGE UP (ref 80–100)
NRBC # BLD: 0 /100 WBCS — SIGNIFICANT CHANGE UP (ref 0–0)
PHOSPHATE SERPL-MCNC: 3.9 MG/DL — SIGNIFICANT CHANGE UP (ref 2.5–4.5)
PLATELET # BLD AUTO: 153 K/UL — SIGNIFICANT CHANGE UP (ref 150–400)
POTASSIUM SERPL-MCNC: 4.4 MMOL/L — SIGNIFICANT CHANGE UP (ref 3.5–5.3)
POTASSIUM SERPL-SCNC: 4.4 MMOL/L — SIGNIFICANT CHANGE UP (ref 3.5–5.3)
RBC # BLD: 4.39 M/UL — SIGNIFICANT CHANGE UP (ref 3.8–5.2)
RBC # FLD: 13 % — SIGNIFICANT CHANGE UP (ref 10.3–14.5)
SODIUM SERPL-SCNC: 140 MMOL/L — SIGNIFICANT CHANGE UP (ref 135–145)
WBC # BLD: 9.24 K/UL — SIGNIFICANT CHANGE UP (ref 3.8–10.5)
WBC # FLD AUTO: 9.24 K/UL — SIGNIFICANT CHANGE UP (ref 3.8–10.5)

## 2020-11-04 PROCEDURE — 99232 SBSQ HOSP IP/OBS MODERATE 35: CPT | Mod: GC

## 2020-11-04 PROCEDURE — 70553 MRI BRAIN STEM W/O & W/DYE: CPT | Mod: 26

## 2020-11-04 PROCEDURE — 99024 POSTOP FOLLOW-UP VISIT: CPT

## 2020-11-04 RX ORDER — ENOXAPARIN SODIUM 100 MG/ML
40 INJECTION SUBCUTANEOUS
Refills: 0 | Status: DISCONTINUED | OUTPATIENT
Start: 2020-11-04 | End: 2020-11-05

## 2020-11-04 RX ADMIN — LISINOPRIL 40 MILLIGRAM(S): 2.5 TABLET ORAL at 05:27

## 2020-11-04 RX ADMIN — LEVETIRACETAM 500 MILLIGRAM(S): 250 TABLET, FILM COATED ORAL at 05:27

## 2020-11-04 RX ADMIN — Medication 4 MILLIGRAM(S): at 21:07

## 2020-11-04 RX ADMIN — CARVEDILOL PHOSPHATE 6.25 MILLIGRAM(S): 80 CAPSULE, EXTENDED RELEASE ORAL at 05:27

## 2020-11-04 RX ADMIN — LEVETIRACETAM 500 MILLIGRAM(S): 250 TABLET, FILM COATED ORAL at 19:16

## 2020-11-04 RX ADMIN — PANTOPRAZOLE SODIUM 40 MILLIGRAM(S): 20 TABLET, DELAYED RELEASE ORAL at 05:27

## 2020-11-04 RX ADMIN — Medication 4 MILLIGRAM(S): at 15:29

## 2020-11-04 RX ADMIN — ENOXAPARIN SODIUM 40 MILLIGRAM(S): 100 INJECTION SUBCUTANEOUS at 21:07

## 2020-11-04 RX ADMIN — CARVEDILOL PHOSPHATE 6.25 MILLIGRAM(S): 80 CAPSULE, EXTENDED RELEASE ORAL at 19:17

## 2020-11-04 RX ADMIN — Medication 4 MILLIGRAM(S): at 00:04

## 2020-11-04 RX ADMIN — AMLODIPINE BESYLATE 10 MILLIGRAM(S): 2.5 TABLET ORAL at 05:27

## 2020-11-04 RX ADMIN — Medication 4 MILLIGRAM(S): at 05:27

## 2020-11-04 RX ADMIN — SENNA PLUS 2 TABLET(S): 8.6 TABLET ORAL at 21:07

## 2020-11-04 NOTE — PROGRESS NOTE ADULT - ASSESSMENT
76 y/o RHD female PMH HTN, brain mass presents with expressive aphasia and further management by Neurosurgery.

## 2020-11-04 NOTE — DISCHARGE NOTE PROVIDER - NSDCCPTREATMENT_GEN_ALL_CORE_FT
PRINCIPAL PROCEDURE  Procedure: Craniotomy for resection of tumor of left side of brain  Findings and Treatment:

## 2020-11-04 NOTE — PROGRESS NOTE ADULT - SUBJECTIVE AND OBJECTIVE BOX
Interval Events: Reviewed  Patient seen and examined at bedside.    Patient is a 75y old  Female who presents with a chief complaint of Brain Tumor (04 Nov 2020 15:51)    she is fine and pain is controlled she will get oob today  PAST MEDICAL & SURGICAL HISTORY:  Brain tumor    HTN (hypertension)    No significant past surgical history        MEDICATIONS:  Pulmonary:    Antimicrobials:    Anticoagulants:  enoxaparin Injectable 40 milliGRAM(s) SubCutaneous <User Schedule>    Cardiac:  amLODIPine   Tablet 10 milliGRAM(s) Oral daily  carvedilol 6.25 milliGRAM(s) Oral every 12 hours  hydrALAZINE Injectable 10 milliGRAM(s) IV Push every 3 hours PRN  lisinopril 40 milliGRAM(s) Oral daily      Allergies    codeine (Unknown)  sulfa drugs (Unknown)    Intolerances        Vital Signs Last 24 Hrs  T(C): 37.1 (04 Nov 2020 20:43), Max: 37.1 (04 Nov 2020 01:12)  T(F): 98.7 (04 Nov 2020 20:43), Max: 98.8 (04 Nov 2020 01:12)  HR: 66 (04 Nov 2020 20:43) (57 - 67)  BP: 111/71 (04 Nov 2020 20:43) (103/63 - 155/93)  BP(mean): --  RR: 17 (04 Nov 2020 20:43) (16 - 21)  SpO2: 99% (04 Nov 2020 20:43) (94% - 99%)    11-03 @ 07:01 - 11-04 @ 07:00  --------------------------------------------------------  IN: 1700 mL / OUT: 902 mL / NET: 798 mL    11-04 @ 07:01 - 11-04 @ 22:00  --------------------------------------------------------  IN: 120 mL / OUT: 30 mL / NET: 90 mL          Review of Systems:   •	General: negative  •	Skin/Breast: negative  •	Ophthalmologic: negative  •	ENMT: negative  •	Respiratory and Thorax: negative  •	Cardiovascular: negative  •	Gastrointestinal: negative  •	Genitourinary: negative  •	Musculoskeletal: negative  •	Neurological: negative  •	Psychiatric: negative  •	Hematology/Lymphatics: negative  •	Endocrine: negative  •	Allergic/Immunologic: negative    Physical Exam:   • Constitutional:	Well-developed, well nourished  • Eyes:	EOMI; PERRL; no drainage or redness  • ENMT:	No oral lesions; no gross abnormalities  • Neck	No bruits; no thyromegaly or nodules  • Breasts:	not examined  • Back:	No deformity or limitation of movement  • Respiratory:	Breath Sounds equal & clear to percussion & auscultation, no accessory muscle use  • Cardiovascular:	Regular rate & rhythm, normal S1, S2; no murmurs, gallops or rubs; no S3, S4  • Gastrointestinal:	Soft, non-tender, no hepatosplenomegaly, normal bowel sounds  • Genitourinary:	not examined  • Rectal: not examined  • Extremities:	No cyanosis, clubbing or edema  • Vascular:	Equal and normal pulses (carotid, femoral, dorsalis pedis)  • Neurologica:l	not examined  • Skin:	No lesions; no rash  • Lymph Nodes:	No lymphadedenopathy  • Musculoskeletal:	No joint pain, swelling or deformity; no limitation of movement        LABS:      CBC Full  -  ( 04 Nov 2020 07:42 )  WBC Count : 9.24 K/uL  RBC Count : 4.39 M/uL  Hemoglobin : 12.9 g/dL  Hematocrit : 40.0 %  Platelet Count - Automated : 153 K/uL  Mean Cell Volume : 91.1 fl  Mean Cell Hemoglobin : 29.4 pg  Mean Cell Hemoglobin Concentration : 32.3 gm/dL  Auto Neutrophil # : x  Auto Lymphocyte # : x  Auto Monocyte # : x  Auto Eosinophil # : x  Auto Basophil # : x  Auto Neutrophil % : x  Auto Lymphocyte % : x  Auto Monocyte % : x  Auto Eosinophil % : x  Auto Basophil % : x    11-04    140  |  108  |  15  ----------------------------<  147<H>  4.4   |  23  |  0.55    Ca    8.4      04 Nov 2020 07:42  Phos  3.9     11-04  Mg     2.4     11-04                          RADIOLOGY & ADDITIONAL STUDIES (The following images were personally reviewed):  [place]:                                     No  Urine output:                       adequate  DVT prophylaxis:                 Yes  Flattus:                                  Yes  Bowel movement:              No

## 2020-11-04 NOTE — DISCHARGE NOTE PROVIDER - NSDCFUADDINST_GEN_ALL_CORE_FT
1. You must wash your hair starting 24 hours after being home. Use your normal  shampoo. During the shampoo, massage the staples to remove any dried blood  or crusting. This keeps your wound clean and helps healing. You should shampoo everyday.  2. Mild swelling around the incision is common. Keep incision open to air and dry.  3. Call our office at [phone] to set up the appointment with an NP for wound check  once you get home.  4. Inform the doctor immediately if you have a fever (above 101), chills, night  sweats, wound drainage, increasing wound redness or pain, nausea, vomiting, or  worsening headache.  B. ACTIVITY LEVEL  1. Fatigue is common following brain surgery, rest if you are tired.  2. You should get up and walk around every hour during the daytime.  3. No bending, lifting or twisting for the first 3 weeks, but walking is  recommended.  4. Drink plenty of water, stay out of the sun.  You may be given a narcotic pain reliever such as Percocet  (oxycodone-acetaminophen). This is for short term use. Avoid use of alcohol when taking these meds. 1. You must wash your hair starting 24 hours after being home. Use your normal  shampoo. During the shampoo, massage the staples to remove any dried blood  or crusting. This keeps your wound clean and helps healing. You should shampoo everyday.  2. Mild swelling around the incision is common. Keep incision open to air and dry.  3. Call our office at [phone] to set up the appointment with an NP for wound check  once you get home. - you have an appointment on 11/20  4. Inform the doctor immediately if you have a fever (above 101), chills, night  sweats, wound drainage, increasing wound redness or pain, nausea, vomiting, or  worsening headache.  B. ACTIVITY LEVEL  1. Fatigue is common following brain surgery, rest if you are tired.  2. You should get up and walk around every hour during the daytime.  3. No bending, lifting or twisting for the first 3 weeks, but walking is  recommended.  4. Drink plenty of water, stay out of the sun.  You may be given a narcotic pain reliever such as Percocet  (oxycodone-acetaminophen). This is for short term use. Avoid use of alcohol when taking these meds.    Decadron plan at rehab:  4mg Q8 hours x1 day  2mg Q8 hours x2 days  2mg Q12 hours daily - indefinitely, stay on decadron until Dr. Bosch tells you otherwise  At rehab, please monitor glucose while on Decadron    Continue Keppra 500 BID until Dr. Bosch tells you otherwise

## 2020-11-04 NOTE — DISCHARGE NOTE PROVIDER - NSDCCPCAREPLAN_GEN_ALL_CORE_FT
PRINCIPAL DISCHARGE DIAGNOSIS  Diagnosis: Brain tumor  Assessment and Plan of Treatment: frozen: high grade malignant mass

## 2020-11-04 NOTE — DISCHARGE NOTE PROVIDER - HOSPITAL COURSE
HPI:  75 y.o. female with HTN presents today after discharge from hospital in [place], sent by Dr. Bosch for findings of large brain mass on CT/MR of the brain. Patient and her son at bedside report several months of worsening word finding difficulty, as well as reading and writing. Patient denies any headaches, visual/hearing changes, extremity weakness or numbness, gait disturbance, syncope or seizures. She denies any constitutional symptoms such as weight loss, generalized fatigue or anorexia. Patient comes with CDs with CT and MR. She denies use of Aspirin, or other anticoagulation. Patient was started on Keppra and Decadron in previous facility. (29 Oct 2020 19:05)    Hospital Course:   10/29: admitted to [facility] for brain tumor w/u. dopplers negative  10/30: LORENZA o/n, neuro stable. pending MR nelson, pre op monday 11/1: LORENZA o/n, improved aphasia, on decadron. preop for monday. MR nelson done. med clearance pending   11/2: POD0 L temporal crani for tumor resection. frozen: high grade malignant neoplasm. SG JPx1.   11/3: POD1. LORENZA o/n, neuro stable. passed TOV.   11/4: POD2: LORENZA overnight. Neuro stable. MRI today.    HPI:  75 y.o. female with HTN presents today after discharge from hospital in [place], sent by Dr. Bosch for findings of large brain mass on CT/MR of the brain. Patient and her son at bedside report several months of worsening word finding difficulty, as well as reading and writing. Patient denies any headaches, visual/hearing changes, extremity weakness or numbness, gait disturbance, syncope or seizures. She denies any constitutional symptoms such as weight loss, generalized fatigue or anorexia. Patient comes with CDs with CT and MR. She denies use of Aspirin, or other anticoagulation. Patient was started on Keppra and Decadron in previous facility. (29 Oct 2020 19:05)    Hospital Course:   10/29: admitted to [facility] for brain tumor w/u. dopplers negative  10/30: LORENZA o/n, neuro stable. pending MR nelson, pre op monday 11/1: LORENZA o/n, improved aphasia, on decadron. preop for monday. MR nelson done. med clearance pending   11/2: POD0 L temporal crani for tumor resection. frozen: high grade malignant neoplasm. SG JPx1.   11/3: POD1. LORENZA o/n, neuro stable. passed TOV.   11/4: POD2: LORENZA overnight. Neuro stable. MRI today.     Patient medically cleared for discharge to rehab today

## 2020-11-04 NOTE — DISCHARGE NOTE PROVIDER - NSDCMRMEDTOKEN_GEN_ALL_CORE_FT
carvedilol 6.25 mg oral tablet: 1 tab(s) orally 2 times a day  Decadron 4 mg oral tablet: 1 tab(s) orally 4 times a day  Keppra 500 mg oral tablet: 1 tab(s) orally 2 times a day   acetaminophen 325 mg oral tablet: 2 tab(s) orally every 6 hours, As needed, Temp greater or equal to 38C (100.4F), Mild Pain (1 - 3)  amLODIPine 10 mg oral tablet: 1 tab(s) orally once a day  bisacodyl 5 mg oral delayed release tablet: 1 tab(s) orally once a day, As needed, Constipation  carvedilol 6.25 mg oral tablet: 1 tab(s) orally 2 times a day  dexamethasone: Decadron taper:  4mg Q8 hours x1 day  2mg Q8 hours x2 days  2mg Q12 hours indefinitely   enoxaparin: 40 unit(s) subcutaneous once a day (at bedtime)  Keppra 500 mg oral tablet: 1 tab(s) orally 2 times a day  ketorolac: 15 milligram(s) injectable every 6 hours  lisinopril 40 mg oral tablet: 1 tab(s) orally once a day  pantoprazole 40 mg oral delayed release tablet: 1 tab(s) orally once a day (before a meal)  senna oral tablet: 2 tab(s) orally once a day (at bedtime)   acetaminophen 325 mg oral tablet: 2 tab(s) orally every 6 hours, As needed, Temp greater or equal to 38C (100.4F), Mild Pain (1 - 3)  amLODIPine 10 mg oral tablet: 1 tab(s) orally once a day  bisacodyl 5 mg oral delayed release tablet: 1 tab(s) orally once a day, As needed, Constipation  carvedilol 6.25 mg oral tablet: 1 tab(s) orally 2 times a day  dexamethasone: Decadron taper:  4mg Q8 hours x1 day  2mg Q8 hours x2 days  2mg Q12 hours indefinitely   enoxaparin: 40 unit(s) subcutaneous once a day (at bedtime)  Keppra 500 mg oral tablet: 1 tab(s) orally 2 times a day  lisinopril 40 mg oral tablet: 1 tab(s) orally once a day  pantoprazole 40 mg oral delayed release tablet: 1 tab(s) orally once a day (before a meal)  senna oral tablet: 2 tab(s) orally once a day (at bedtime)

## 2020-11-04 NOTE — PROGRESS NOTE ADULT - SUBJECTIVE AND OBJECTIVE BOX
HPI:  75 y.o. female with HTN presents today after discharge from hospital in [place], sent by Dr. Bosch for findings of large brain mass on CT/MR of the brain. Patient and her son at bedside report several months of worsening word finding difficulty, as well as reading and writing. Patient denies any headaches, visual/hearing changes, extremity weakness or numbness, gait disturbance, syncope or seizures. She denies any constitutional symptoms such as weight loss, generalized fatigue or anorexia. Patient comes with CDs with CT and MR. She denies use of Aspirin, or other anticoagulation. Patient was started on Keppra and Decadron in previous facility. (29 Oct 2020 19:05)    OVERNIGHT EVENTS: no acute events overnight. Neuro stable.       Hospital Course:   10/29: admitted to [facility] for brain tumor w/u. dopplers negative  10/30: LORENZA o/n, neuro stable. pending MR nelson, pre op monday 11/1: LORENZA o/n, improved aphasia, on decadron. preop for monday. MR nelson done. med clearance pending   11/2: POD0 L temporal crani for tumor resection. frozen: high grade malignant neoplasm. SG JPx1.   11/3: POD1. LORENZA o/n, neuro stable. passed TOV.   11/4: POD2: LORENZA overnight. Neuro stable.       Vital Signs Last 24 Hrs  T(C): 36.6 (04 Nov 2020 09:32), Max: 37.1 (04 Nov 2020 01:12)  T(F): 97.8 (04 Nov 2020 09:32), Max: 98.8 (04 Nov 2020 01:12)  HR: 66 (04 Nov 2020 09:32) (55 - 68)  BP: 103/63 (04 Nov 2020 09:32) (96/51 - 136/71)  BP(mean): 98 (03 Nov 2020 15:00) (68 - 98)  RR: 18 (04 Nov 2020 09:32) (17 - 20)  SpO2: 97% (04 Nov 2020 09:32) (93% - 98%)    I&O's Summary    03 Nov 2020 07:01  -  04 Nov 2020 07:00  --------------------------------------------------------  IN: 1700 mL / OUT: 902 mL / NET: 798 mL        PHYSICAL EXAM:   General laying bed, appears well. No acute distress.   Neurological: AOX3. + Expressive aphasia. Follows commands. OE spont. +mild R facial. CNII-XII intact. PERRL. EOMI.  Motor exam: +RUE pronator drift. RUE +4/5. LUE 5/5. RLE 5/5. LLE 5/5.   Sensation:  Strength intact to light touch throughout.   Cardiovascular: Regular rate and rhythm. S1/S2   Respiratory: Room air. Chest expansion symmetrical bilaterally. CTAB.   Gastrointestinal: Abdomen non-tender, non-distended.   Extremities: extremities warm, dry, nontender.   Incision/Wound: crani site clean, dry, and intact.     TUBES/LINES:  [] [place]  [] Lumbar Drain  [x] Wound Drains: SG ERICKA x1.   [] Others      DIET:  [] NPO  [x] Mechanical  [] Tube feeds    LABS:                        12.9   9.24  )-----------( 153      ( 04 Nov 2020 07:42 )             40.0     11-04    140  |  108  |  15  ----------------------------<  147<H>  4.4   |  23  |  0.55    Ca    8.4      04 Nov 2020 07:42  Phos  3.9     11-04  Mg     2.4     11-04              CAPILLARY BLOOD GLUCOSE      POCT Blood Glucose.: 120 mg/dL (03 Nov 2020 16:37)      Drug Levels: [] N/A    CSF Analysis: [] N/A      Allergies    codeine (Unknown)  sulfa drugs (Unknown)    Intolerances      MEDICATIONS:  Antibiotics:    Neuro:  acetaminophen   Tablet .. 650 milliGRAM(s) Oral every 6 hours PRN  ketorolac   Injectable 15 milliGRAM(s) IV Push every 6 hours PRN  levETIRAcetam 500 milliGRAM(s) Oral every 12 hours  ondansetron Injectable 4 milliGRAM(s) IV Push every 6 hours PRN    Anticoagulation:    OTHER:  amLODIPine   Tablet 10 milliGRAM(s) Oral daily  bisacodyl 5 milliGRAM(s) Oral daily PRN  carvedilol 6.25 milliGRAM(s) Oral every 12 hours  dexAMETHasone     Tablet   Oral   dexAMETHasone     Tablet 4 milliGRAM(s) Oral every 8 hours  dextrose 40% Gel 15 Gram(s) Oral once PRN  dextrose 50% Injectable 12.5 Gram(s) IV Push once  dextrose 50% Injectable 25 Gram(s) IV Push once  dextrose 50% Injectable 25 Gram(s) IV Push once  glucagon  Injectable 1 milliGRAM(s) IntraMuscular once PRN  hydrALAZINE Injectable 10 milliGRAM(s) IV Push every 3 hours PRN  lisinopril 40 milliGRAM(s) Oral daily  pantoprazole    Tablet 40 milliGRAM(s) Oral before breakfast  senna 2 Tablet(s) Oral at bedtime    IVF:  dextrose 5%. 1000 milliLiter(s) IV Continuous <Continuous>    CULTURES:    RADIOLOGY & ADDITIONAL TESTS:      ASSESSMENT:  75 year old female with HTN, large left temporal brain mass with worsening expressive aphasia now; POD2 s/p L temporal crani for tumor resection (11/2/20), frozen high grade malignant neoplasm      BRAIN TUMOR    No pertinent family history in first degree relatives    Handoff    MEWS Score    Brain tumor    HTN (hypertension)    Brain tumor    Brain tumor    Brain tumor    Preop exam for internal medicine    HTN (hypertension)    Brain tumor    Essential hypertension    Craniotomy for resection of tumor of left side of brain    No significant past surgical history    MED EVAL    SysAdmin_VisitLink        PLAN:  NEURO:   Neuro checks Q4   Vital signs Q4   OOB with assistance.   Pain management with Tylenol.   Continue with Keppra 500 BID.    Zofran PRN as needed for nausea.   Dexamethasone taper.     CARDIOVASCULAR:  Continue with home blood pressure medications (carvedilol, lisinopril, & amlodipine)   PRN Hydralazine for SBP > 140.    Normotensive SBP goal.     PULMONARY:  Continue with monitoring on room air.   Educate patient on importance of incentive spirometer. Continue with use of incentive spirometer.     RENAL:  Monitor electrolytes and replete as needed.     GI:  Continue with bowel regimen (senna )  Continue with protonix.     HEME:  CBC stable. Continue to monitor CBC.     ID:  Afebrile. Continue to monitor temperature and WBC.    ENDO:  glucose goal 140-180  ISS.     DVT PROPHYLAXIS:  [x] Venodynes                                [] Heparin/Lovenox    DISPOSITION: acute rehab per PT/OT.       Discuss with Dr. Bosch.     Assessment:  Present when checked    []  GCS  E   V  M     Heart Failure: []Acute, [] acute on chronic , []chronic  Heart Failure:  [] Diastolic (HFpEF), [] Systolic (HFrEF), []Combined (HFpEF and HFrEF), [] RHF, [] Pulm HTN, [] Other    [] REYNA, [] ATN, [] AIN, [] other  [] CKD1, [] CKD2, [] CKD 3, [] CKD 4, [] CKD 5, []ESRD    Encephalopathy: [] Metabolic, [] Hepatic, [] toxic, [] Neurological, [] Other    Abnormal Nurtitional Status: [] malnurtition (see nutrition note), [ ]underweight: BMI < 19, [] morbid obesity: BMI >40, [] Cachexia    [] Sepsis  [] hypovolemic shock,[] cardiogenic shock, [] hemorrhagic shock, [] neuogenic shock  [] Acute Respiratory Failure  []Cerebral edema, [] Brain compression/ herniation,   [] Functional quadriplegia  [] Acute blood loss anemia   HPI:  75 y.o. female with HTN presents today after discharge from hospital in [place], sent by Dr. Bosch for findings of large brain mass on CT/MR of the brain. Patient and her son at bedside report several months of worsening word finding difficulty, as well as reading and writing. Patient denies any headaches, visual/hearing changes, extremity weakness or numbness, gait disturbance, syncope or seizures. She denies any constitutional symptoms such as weight loss, generalized fatigue or anorexia. Patient comes with CDs with CT and MR. She denies use of Aspirin, or other anticoagulation. Patient was started on Keppra and Decadron in previous facility. (29 Oct 2020 19:05)    OVERNIGHT EVENTS: no acute events overnight. Neuro stable.       Hospital Course:   10/29: admitted to [facility] for brain tumor w/u. dopplers negative  10/30: LORENZA o/n, neuro stable. pending MR nelson, pre op monday 11/1: LORENZA o/n, improved aphasia, on decadron. preop for monday. MR leslie done. med clearance pending   11/2: POD0 L temporal crani for tumor resection. frozen: high grade malignant neoplasm. SG JPx1.   11/3: POD1. LORENZA o/n, neuro stable. passed TOV.   11/4: POD2: LORENZA overnight. Neuro stable. MRI today.       Vital Signs Last 24 Hrs  T(C): 36.6 (04 Nov 2020 09:32), Max: 37.1 (04 Nov 2020 01:12)  T(F): 97.8 (04 Nov 2020 09:32), Max: 98.8 (04 Nov 2020 01:12)  HR: 66 (04 Nov 2020 09:32) (55 - 68)  BP: 103/63 (04 Nov 2020 09:32) (96/51 - 136/71)  BP(mean): 98 (03 Nov 2020 15:00) (68 - 98)  RR: 18 (04 Nov 2020 09:32) (17 - 20)  SpO2: 97% (04 Nov 2020 09:32) (93% - 98%)    I&O's Summary    03 Nov 2020 07:01  -  04 Nov 2020 07:00  --------------------------------------------------------  IN: 1700 mL / OUT: 902 mL / NET: 798 mL        PHYSICAL EXAM:   General laying bed, appears well. No acute distress.   Neurological: AOX3. + Expressive aphasia. Follows commands. OE spont. +mild R facial. CNII-XII intact. PERRL. EOMI.  Motor exam: +RUE pronator drift. RUE +4/5. LUE 5/5. RLE 5/5. LLE 5/5.   Sensation:  Strength intact to light touch throughout.   Cardiovascular: Regular rate and rhythm. S1/S2   Respiratory: Room air. Chest expansion symmetrical bilaterally. CTAB.   Gastrointestinal: Abdomen non-tender, non-distended.   Extremities: extremities warm, dry, nontender.   Incision/Wound: crani site clean, dry, and intact.     TUBES/LINES:  [] [place]  [] Lumbar Drain  [x] Wound Drains: SG ERICKA x1.   [] Others      DIET:  [] NPO  [x] Mechanical  [] Tube feeds    LABS:                        12.9   9.24  )-----------( 153      ( 04 Nov 2020 07:42 )             40.0     11-04    140  |  108  |  15  ----------------------------<  147<H>  4.4   |  23  |  0.55    Ca    8.4      04 Nov 2020 07:42  Phos  3.9     11-04  Mg     2.4     11-04              CAPILLARY BLOOD GLUCOSE      POCT Blood Glucose.: 120 mg/dL (03 Nov 2020 16:37)      Drug Levels: [] N/A    CSF Analysis: [] N/A      Allergies    codeine (Unknown)  sulfa drugs (Unknown)    Intolerances      MEDICATIONS:  Antibiotics:    Neuro:  acetaminophen   Tablet .. 650 milliGRAM(s) Oral every 6 hours PRN  ketorolac   Injectable 15 milliGRAM(s) IV Push every 6 hours PRN  levETIRAcetam 500 milliGRAM(s) Oral every 12 hours  ondansetron Injectable 4 milliGRAM(s) IV Push every 6 hours PRN    Anticoagulation:    OTHER:  amLODIPine   Tablet 10 milliGRAM(s) Oral daily  bisacodyl 5 milliGRAM(s) Oral daily PRN  carvedilol 6.25 milliGRAM(s) Oral every 12 hours  dexAMETHasone     Tablet   Oral   dexAMETHasone     Tablet 4 milliGRAM(s) Oral every 8 hours  dextrose 40% Gel 15 Gram(s) Oral once PRN  dextrose 50% Injectable 12.5 Gram(s) IV Push once  dextrose 50% Injectable 25 Gram(s) IV Push once  dextrose 50% Injectable 25 Gram(s) IV Push once  glucagon  Injectable 1 milliGRAM(s) IntraMuscular once PRN  hydrALAZINE Injectable 10 milliGRAM(s) IV Push every 3 hours PRN  lisinopril 40 milliGRAM(s) Oral daily  pantoprazole    Tablet 40 milliGRAM(s) Oral before breakfast  senna 2 Tablet(s) Oral at bedtime    IVF:  dextrose 5%. 1000 milliLiter(s) IV Continuous <Continuous>    CULTURES:    RADIOLOGY & ADDITIONAL TESTS:      ASSESSMENT:  75 year old female with HTN, large left temporal brain mass with worsening expressive aphasia now; POD2 s/p L temporal crani for tumor resection (11/2/20), frozen high grade malignant neoplasm      BRAIN TUMOR    No pertinent family history in first degree relatives    Handoff    MEWS Score    Brain tumor    HTN (hypertension)    Brain tumor    Brain tumor    Brain tumor    Preop exam for internal medicine    HTN (hypertension)    Brain tumor    Essential hypertension    Craniotomy for resection of tumor of left side of brain    No significant past surgical history    MED EVAL    SysAdmin_VisitLink        PLAN:  NEURO:   - Neuro checks Q4   - Vital signs Q4   - OOB with assistance.   - Pain management with Tylenol.   - Continue with Keppra 500 BID.    - Dexamethasone taper.   - MRI post op today    CARDIOVASCULAR:  - Continue with home blood pressure medications (carvedilol, lisinopril, & amlodipine)   - Normotensive SBP goal.     PULMONARY:  - Continue with monitoring on room air.   - Educate patient on importance of incentive spirometer. Continue with use of incentive spirometer.     RENAL:  - Monitor electrolytes and replete as needed.     GI:  - Continue with bowel regimen (senna )  - Continue with protonix.     HEME:  - CBC stable. Continue to monitor H/H    ID:  Afebrile. Continue to monitor temperature and WBC.    ENDO:  glucose goal 140-180  ISS.     DVT PROPHYLAXIS:  [x] Venodynes                                [x] Heparin/Lovenox    DISPOSITION: acute rehab per PT/OT.       Discuss with Dr. Bosch.     Assessment:  Present when checked    []  GCS  E   V  M     Heart Failure: []Acute, [] acute on chronic , []chronic  Heart Failure:  [] Diastolic (HFpEF), [] Systolic (HFrEF), []Combined (HFpEF and HFrEF), [] RHF, [] Pulm HTN, [] Other    [] REYNA, [] ATN, [] AIN, [] other  [] CKD1, [] CKD2, [] CKD 3, [] CKD 4, [] CKD 5, []ESRD    Encephalopathy: [] Metabolic, [] Hepatic, [] toxic, [] Neurological, [] Other    Abnormal Nurtitional Status: [] malnurtition (see nutrition note), [ ]underweight: BMI < 19, [] morbid obesity: BMI >40, [] Cachexia    [] Sepsis  [] hypovolemic shock,[] cardiogenic shock, [] hemorrhagic shock, [] neuogenic shock  [] Acute Respiratory Failure  []Cerebral edema, [] Brain compression/ herniation,   [] Functional quadriplegia  [] Acute blood loss anemia   HPI:  75 y.o. female with HTN presents today after discharge from hospital in [place], sent by Dr. Bosch for findings of large brain mass on CT/MR of the brain. Patient and her son at bedside report several months of worsening word finding difficulty, as well as reading and writing. Patient denies any headaches, visual/hearing changes, extremity weakness or numbness, gait disturbance, syncope or seizures. She denies any constitutional symptoms such as weight loss, generalized fatigue or anorexia. Patient comes with CDs with CT and MR. She denies use of Aspirin, or other anticoagulation. Patient was started on Keppra and Decadron in previous facility. (29 Oct 2020 19:05)    OVERNIGHT EVENTS: no acute events overnight. Neuro stable.       Hospital Course:   10/29: admitted to [facility] for brain tumor w/u. dopplers negative  10/30: LORENZA o/n, neuro stable. pending MR nelson, pre op monday 11/1: LORENZA o/n, improved aphasia, on decadron. preop for monday. MR leslie done. med clearance pending   11/2: POD0 L temporal crani for tumor resection. frozen: high grade malignant neoplasm. SG JPx1.   11/3: POD1. LORENZA o/n, neuro stable. passed TOV.   11/4: POD2: LORENZA overnight. Neuro stable. MRI today.       Vital Signs Last 24 Hrs  T(C): 36.6 (04 Nov 2020 09:32), Max: 37.1 (04 Nov 2020 01:12)  T(F): 97.8 (04 Nov 2020 09:32), Max: 98.8 (04 Nov 2020 01:12)  HR: 66 (04 Nov 2020 09:32) (55 - 68)  BP: 103/63 (04 Nov 2020 09:32) (96/51 - 136/71)  BP(mean): 98 (03 Nov 2020 15:00) (68 - 98)  RR: 18 (04 Nov 2020 09:32) (17 - 20)  SpO2: 97% (04 Nov 2020 09:32) (93% - 98%)    I&O's Summary    03 Nov 2020 07:01  -  04 Nov 2020 07:00  --------------------------------------------------------  IN: 1700 mL / OUT: 902 mL / NET: 798 mL        PHYSICAL EXAM:   General laying bed, appears well. No acute distress.   Neurological: AOX3. + Expressive aphasia. Follows commands. OE spont. +mild R facial. CNII-XII intact. PERRL. EOMI.  Motor exam: +RUE pronator drift. RUE +4/5. LUE 5/5. RLE 5/5. LLE 5/5.   Sensation:  Strength intact to light touch throughout.   Cardiovascular: Regular rate and rhythm. S1/S2   Respiratory: Room air. Chest expansion symmetrical bilaterally. CTAB.   Gastrointestinal: Abdomen non-tender, non-distended.   Extremities: extremities warm, dry, nontender.   Incision/Wound: crani site clean, dry, and intact.     TUBES/LINES:  [] [place]  [] Lumbar Drain  [x] Wound Drains: SG ERICKA x1.   [] Others      DIET:  [] NPO  [x] Mechanical  [] Tube feeds    LABS:                        12.9   9.24  )-----------( 153      ( 04 Nov 2020 07:42 )             40.0     11-04    140  |  108  |  15  ----------------------------<  147<H>  4.4   |  23  |  0.55    Ca    8.4      04 Nov 2020 07:42  Phos  3.9     11-04  Mg     2.4     11-04              CAPILLARY BLOOD GLUCOSE      POCT Blood Glucose.: 120 mg/dL (03 Nov 2020 16:37)      Drug Levels: [] N/A    CSF Analysis: [] N/A      Allergies    codeine (Unknown)  sulfa drugs (Unknown)    Intolerances      MEDICATIONS:  Antibiotics:    Neuro:  acetaminophen   Tablet .. 650 milliGRAM(s) Oral every 6 hours PRN  ketorolac   Injectable 15 milliGRAM(s) IV Push every 6 hours PRN  levETIRAcetam 500 milliGRAM(s) Oral every 12 hours  ondansetron Injectable 4 milliGRAM(s) IV Push every 6 hours PRN    Anticoagulation:    OTHER:  amLODIPine   Tablet 10 milliGRAM(s) Oral daily  bisacodyl 5 milliGRAM(s) Oral daily PRN  carvedilol 6.25 milliGRAM(s) Oral every 12 hours  dexAMETHasone     Tablet   Oral   dexAMETHasone     Tablet 4 milliGRAM(s) Oral every 8 hours  dextrose 40% Gel 15 Gram(s) Oral once PRN  dextrose 50% Injectable 12.5 Gram(s) IV Push once  dextrose 50% Injectable 25 Gram(s) IV Push once  dextrose 50% Injectable 25 Gram(s) IV Push once  glucagon  Injectable 1 milliGRAM(s) IntraMuscular once PRN  hydrALAZINE Injectable 10 milliGRAM(s) IV Push every 3 hours PRN  lisinopril 40 milliGRAM(s) Oral daily  pantoprazole    Tablet 40 milliGRAM(s) Oral before breakfast  senna 2 Tablet(s) Oral at bedtime    IVF:  dextrose 5%. 1000 milliLiter(s) IV Continuous <Continuous>    CULTURES:    RADIOLOGY & ADDITIONAL TESTS:      ASSESSMENT:  75 year old female with HTN, large left temporal brain mass with worsening expressive aphasia now; POD2 s/p L temporal crani for tumor resection (11/2/20), frozen high grade malignant neoplasm      BRAIN TUMOR    No pertinent family history in first degree relatives    Handoff    MEWS Score    Brain tumor    HTN (hypertension)    Brain tumor    Brain tumor    Brain tumor    Preop exam for internal medicine    HTN (hypertension)    Brain tumor    Essential hypertension    Craniotomy for resection of tumor of left side of brain    No significant past surgical history    MED EVAL    SysAdmin_VisitLink        PLAN:  NEURO:   - Neuro checks Q4   - Vital signs Q4   - OOB with assistance.   - Pain management with Tylenol.   - Continue with Keppra 500 BID.    - Dexamethasone taper.   - MRI post op today     CARDIOVASCULAR:  - Continue with home blood pressure medications (carvedilol, lisinopril, & amlodipine)   - Normotensive SBP goal.     PULMONARY:  - Continue with monitoring on room air.   - Educate patient on importance of incentive spirometer. Continue with use of incentive spirometer.     RENAL:  - Monitor electrolytes and replete as needed.     GI:  - Continue with bowel regimen (senna )  - Continue with protonix.     HEME:  - CBC stable. Continue to monitor H/H    ID:  Afebrile. Continue to monitor temperature and WBC.    ENDO:  glucose goal 140-180  ISS.     DVT PROPHYLAXIS:  [x] Venodynes                                [x] Heparin/Lovenox    DISPOSITION: acute rehab per PT/OT.     - patient cant have any appointments, testing, or treatment while in rehab    Discuss with Dr. Bosch.     Assessment:  Present when checked    []  GCS  E   V  M     Heart Failure: []Acute, [] acute on chronic , []chronic  Heart Failure:  [] Diastolic (HFpEF), [] Systolic (HFrEF), []Combined (HFpEF and HFrEF), [] RHF, [] Pulm HTN, [] Other    [] REYNA, [] ATN, [] AIN, [] other  [] CKD1, [] CKD2, [] CKD 3, [] CKD 4, [] CKD 5, []ESRD    Encephalopathy: [] Metabolic, [] Hepatic, [] toxic, [] Neurological, [] Other    Abnormal Nurtitional Status: [] malnurtition (see nutrition note), [ ]underweight: BMI < 19, [] morbid obesity: BMI >40, [] Cachexia    [] Sepsis  [] hypovolemic shock,[] cardiogenic shock, [] hemorrhagic shock, [] neuogenic shock  [] Acute Respiratory Failure  []Cerebral edema, [] Brain compression/ herniation,   [] Functional quadriplegia  [] Acute blood loss anemia

## 2020-11-04 NOTE — DISCHARGE NOTE PROVIDER - CARE PROVIDER_API CALL
Graham Bosch)  Neurosurgery  [address], [place] [address]  Phone: [phone]  Fax: [phone]  Follow Up Time:

## 2020-11-05 ENCOUNTER — TRANSCRIPTION ENCOUNTER (OUTPATIENT)
Age: 75
End: 2020-11-05

## 2020-11-05 VITALS
HEART RATE: 58 BPM | OXYGEN SATURATION: 58 % | RESPIRATION RATE: 18 BRPM | SYSTOLIC BLOOD PRESSURE: 124 MMHG | TEMPERATURE: 97 F | DIASTOLIC BLOOD PRESSURE: 71 MMHG

## 2020-11-05 PROBLEM — I10 ESSENTIAL (PRIMARY) HYPERTENSION: Chronic | Status: ACTIVE | Noted: 2020-10-29

## 2020-11-05 PROBLEM — D49.6 NEOPLASM OF UNSPECIFIED BEHAVIOR OF BRAIN: Chronic | Status: ACTIVE | Noted: 2020-10-29

## 2020-11-05 LAB
ANION GAP SERPL CALC-SCNC: 7 MMOL/L — SIGNIFICANT CHANGE UP (ref 5–17)
BUN SERPL-MCNC: 16 MG/DL — SIGNIFICANT CHANGE UP (ref 7–23)
CALCIUM SERPL-MCNC: 8.5 MG/DL — SIGNIFICANT CHANGE UP (ref 8.4–10.5)
CHLORIDE SERPL-SCNC: 105 MMOL/L — SIGNIFICANT CHANGE UP (ref 96–108)
CO2 SERPL-SCNC: 25 MMOL/L — SIGNIFICANT CHANGE UP (ref 22–31)
CREAT SERPL-MCNC: 0.53 MG/DL — SIGNIFICANT CHANGE UP (ref 0.5–1.3)
GLUCOSE SERPL-MCNC: 138 MG/DL — HIGH (ref 70–99)
HCT VFR BLD CALC: 37.1 % — SIGNIFICANT CHANGE UP (ref 34.5–45)
HGB BLD-MCNC: 12.3 G/DL — SIGNIFICANT CHANGE UP (ref 11.5–15.5)
MAGNESIUM SERPL-MCNC: 2.3 MG/DL — SIGNIFICANT CHANGE UP (ref 1.6–2.6)
MCHC RBC-ENTMCNC: 29.5 PG — SIGNIFICANT CHANGE UP (ref 27–34)
MCHC RBC-ENTMCNC: 33.2 GM/DL — SIGNIFICANT CHANGE UP (ref 32–36)
MCV RBC AUTO: 89 FL — SIGNIFICANT CHANGE UP (ref 80–100)
NRBC # BLD: 0 /100 WBCS — SIGNIFICANT CHANGE UP (ref 0–0)
PHOSPHATE SERPL-MCNC: 4.2 MG/DL — SIGNIFICANT CHANGE UP (ref 2.5–4.5)
PLATELET # BLD AUTO: 150 K/UL — SIGNIFICANT CHANGE UP (ref 150–400)
POTASSIUM SERPL-MCNC: 4.2 MMOL/L — SIGNIFICANT CHANGE UP (ref 3.5–5.3)
POTASSIUM SERPL-SCNC: 4.2 MMOL/L — SIGNIFICANT CHANGE UP (ref 3.5–5.3)
RBC # BLD: 4.17 M/UL — SIGNIFICANT CHANGE UP (ref 3.8–5.2)
RBC # FLD: 12.9 % — SIGNIFICANT CHANGE UP (ref 10.3–14.5)
SODIUM SERPL-SCNC: 137 MMOL/L — SIGNIFICANT CHANGE UP (ref 135–145)
WBC # BLD: 7.88 K/UL — SIGNIFICANT CHANGE UP (ref 3.8–10.5)
WBC # FLD AUTO: 7.88 K/UL — SIGNIFICANT CHANGE UP (ref 3.8–10.5)

## 2020-11-05 PROCEDURE — C1713: CPT

## 2020-11-05 PROCEDURE — 93970 EXTREMITY STUDY: CPT

## 2020-11-05 PROCEDURE — 83036 HEMOGLOBIN GLYCOSYLATED A1C: CPT

## 2020-11-05 PROCEDURE — 86923 COMPATIBILITY TEST ELECTRIC: CPT

## 2020-11-05 PROCEDURE — 84100 ASSAY OF PHOSPHORUS: CPT

## 2020-11-05 PROCEDURE — 70553 MRI BRAIN STEM W/O & W/DYE: CPT

## 2020-11-05 PROCEDURE — 71045 X-RAY EXAM CHEST 1 VIEW: CPT

## 2020-11-05 PROCEDURE — 83735 ASSAY OF MAGNESIUM: CPT

## 2020-11-05 PROCEDURE — P9045: CPT

## 2020-11-05 PROCEDURE — A9585: CPT

## 2020-11-05 PROCEDURE — 88333 PATH CONSLTJ SURG CYTO XM 1: CPT

## 2020-11-05 PROCEDURE — C1889: CPT

## 2020-11-05 PROCEDURE — 86901 BLOOD TYPING SEROLOGIC RH(D): CPT

## 2020-11-05 PROCEDURE — 88331 PATH CONSLTJ SURG 1 BLK 1SPC: CPT

## 2020-11-05 PROCEDURE — 81003 URINALYSIS AUTO W/O SCOPE: CPT

## 2020-11-05 PROCEDURE — 88307 TISSUE EXAM BY PATHOLOGIST: CPT

## 2020-11-05 PROCEDURE — 80048 BASIC METABOLIC PNL TOTAL CA: CPT

## 2020-11-05 PROCEDURE — 82962 GLUCOSE BLOOD TEST: CPT

## 2020-11-05 PROCEDURE — 88360 TUMOR IMMUNOHISTOCHEM/MANUAL: CPT

## 2020-11-05 PROCEDURE — 99232 SBSQ HOSP IP/OBS MODERATE 35: CPT | Mod: GC

## 2020-11-05 PROCEDURE — 86900 BLOOD TYPING SEROLOGIC ABO: CPT

## 2020-11-05 PROCEDURE — 99285 EMERGENCY DEPT VISIT HI MDM: CPT

## 2020-11-05 PROCEDURE — 85730 THROMBOPLASTIN TIME PARTIAL: CPT

## 2020-11-05 PROCEDURE — 93321 DOPPLER ECHO F-UP/LMTD STD: CPT

## 2020-11-05 PROCEDURE — 88342 IMHCHEM/IMCYTCHM 1ST ANTB: CPT

## 2020-11-05 PROCEDURE — 85025 COMPLETE CBC W/AUTO DIFF WBC: CPT

## 2020-11-05 PROCEDURE — 97116 GAIT TRAINING THERAPY: CPT

## 2020-11-05 PROCEDURE — 85610 PROTHROMBIN TIME: CPT

## 2020-11-05 PROCEDURE — 99024 POSTOP FOLLOW-UP VISIT: CPT

## 2020-11-05 PROCEDURE — 85027 COMPLETE CBC AUTOMATED: CPT

## 2020-11-05 PROCEDURE — U0003: CPT

## 2020-11-05 PROCEDURE — 36415 COLL VENOUS BLD VENIPUNCTURE: CPT

## 2020-11-05 PROCEDURE — 86850 RBC ANTIBODY SCREEN: CPT

## 2020-11-05 PROCEDURE — 97162 PT EVAL MOD COMPLEX 30 MIN: CPT

## 2020-11-05 PROCEDURE — 97161 PT EVAL LOW COMPLEX 20 MIN: CPT

## 2020-11-05 PROCEDURE — 88341 IMHCHEM/IMCYTCHM EA ADD ANTB: CPT

## 2020-11-05 PROCEDURE — 80053 COMPREHEN METABOLIC PANEL: CPT

## 2020-11-05 RX ORDER — AMLODIPINE BESYLATE 2.5 MG/1
1 TABLET ORAL
Qty: 0 | Refills: 0 | DISCHARGE
Start: 2020-11-05

## 2020-11-05 RX ORDER — PANTOPRAZOLE SODIUM 20 MG/1
1 TABLET, DELAYED RELEASE ORAL
Qty: 0 | Refills: 0 | DISCHARGE
Start: 2020-11-05

## 2020-11-05 RX ORDER — LISINOPRIL 2.5 MG/1
1 TABLET ORAL
Qty: 0 | Refills: 0 | DISCHARGE
Start: 2020-11-05

## 2020-11-05 RX ORDER — DEXAMETHASONE 0.5 MG/5ML
1 ELIXIR ORAL
Qty: 0 | Refills: 0 | DISCHARGE

## 2020-11-05 RX ORDER — SENNA PLUS 8.6 MG/1
2 TABLET ORAL
Qty: 0 | Refills: 0 | DISCHARGE
Start: 2020-11-05

## 2020-11-05 RX ORDER — KETOROLAC TROMETHAMINE 30 MG/ML
15 SYRINGE (ML) INJECTION
Qty: 0 | Refills: 0 | DISCHARGE
Start: 2020-11-05

## 2020-11-05 RX ORDER — ENOXAPARIN SODIUM 100 MG/ML
40 INJECTION SUBCUTANEOUS
Qty: 0 | Refills: 0 | DISCHARGE
Start: 2020-11-05

## 2020-11-05 RX ORDER — ACETAMINOPHEN 500 MG
2 TABLET ORAL
Qty: 0 | Refills: 0 | DISCHARGE
Start: 2020-11-05

## 2020-11-05 RX ORDER — DEXAMETHASONE 0.5 MG/5ML
4 ELIXIR ORAL
Qty: 0 | Refills: 0 | DISCHARGE
Start: 2020-11-05

## 2020-11-05 RX ADMIN — CARVEDILOL PHOSPHATE 6.25 MILLIGRAM(S): 80 CAPSULE, EXTENDED RELEASE ORAL at 05:36

## 2020-11-05 RX ADMIN — PANTOPRAZOLE SODIUM 40 MILLIGRAM(S): 20 TABLET, DELAYED RELEASE ORAL at 05:36

## 2020-11-05 RX ADMIN — Medication 4 MILLIGRAM(S): at 05:36

## 2020-11-05 RX ADMIN — LISINOPRIL 40 MILLIGRAM(S): 2.5 TABLET ORAL at 05:37

## 2020-11-05 RX ADMIN — LEVETIRACETAM 500 MILLIGRAM(S): 250 TABLET, FILM COATED ORAL at 05:36

## 2020-11-05 RX ADMIN — AMLODIPINE BESYLATE 10 MILLIGRAM(S): 2.5 TABLET ORAL at 05:36

## 2020-11-05 NOTE — PROGRESS NOTE ADULT - SUBJECTIVE AND OBJECTIVE BOX
Interval Events: Reviewed  Patient seen and examined at bedside.    Patient is a 75y old  Female who presents with a chief complaint of Brain Tumor (05 Nov 2020 09:38)    she is ok  PAST MEDICAL & SURGICAL HISTORY:  Brain tumor    HTN (hypertension)    No significant past surgical history        MEDICATIONS:  Pulmonary:    Antimicrobials:    Anticoagulants:  enoxaparin Injectable 40 milliGRAM(s) SubCutaneous <User Schedule>    Cardiac:  amLODIPine   Tablet 10 milliGRAM(s) Oral daily  carvedilol 6.25 milliGRAM(s) Oral every 12 hours  hydrALAZINE Injectable 10 milliGRAM(s) IV Push every 3 hours PRN  lisinopril 40 milliGRAM(s) Oral daily      Allergies    codeine (Unknown)  sulfa drugs (Unknown)    Intolerances        Vital Signs Last 24 Hrs  T(C): 36.2 (05 Nov 2020 09:34), Max: 36.9 (05 Nov 2020 05:32)  T(F): 97.1 (05 Nov 2020 09:34), Max: 98.5 (05 Nov 2020 05:32)  HR: 58 (05 Nov 2020 09:34) (55 - 60)  BP: 124/71 (05 Nov 2020 09:34) (124/64 - 132/85)  BP(mean): --  RR: 18 (05 Nov 2020 09:34) (17 - 18)  SpO2: 58% (05 Nov 2020 09:34) (58% - 96%)    11-04 @ 07:01  -  11-05 @ 07:00  --------------------------------------------------------  IN: 360 mL / OUT: 200 mL / NET: 160 mL    11-05 @ 07:01  -  11-05 @ 23:00  --------------------------------------------------------  IN: 200 mL / OUT: 300 mL / NET: -100 mL          Review of Systems:   •	General: negative  •	Skin/Breast: negative  •	Ophthalmologic: negative  •	ENMT: negative  •	Respiratory and Thorax: negative  •	Cardiovascular: negative  •	Gastrointestinal: negative  •	Genitourinary: negative  •	Musculoskeletal: negative  •	Neurological: negative  •	Psychiatric: negative  •	Hematology/Lymphatics: negative  •	Endocrine: negative  •	Allergic/Immunologic: negative    Physical Exam:   • Constitutional:	Well-developed, well nourished  • Eyes:	EOMI; PERRL; no drainage or redness  • ENMT:	No oral lesions; no gross abnormalities  • Neck	No bruits; no thyromegaly or nodules  • Breasts:	not examined  • Back:	No deformity or limitation of movement  • Respiratory:	Breath Sounds equal & clear to percussion & auscultation, no accessory muscle use  • Cardiovascular:	Regular rate & rhythm, normal S1, S2; no murmurs, gallops or rubs; no S3, S4  • Gastrointestinal:	Soft, non-tender, no hepatosplenomegaly, normal bowel sounds  • Genitourinary:	not examined  • Rectal: not examined  • Extremities:	No cyanosis, clubbing or edema  • Vascular:	Equal and normal pulses (carotid, femoral, dorsalis pedis)  • Neurologica:l	not examined  • Skin:	No lesions; no rash  • Lymph Nodes:	No lymphadedenopathy  • Musculoskeletal:	No joint pain, swelling or deformity; no limitation of movement        LABS:      CBC Full  -  ( 05 Nov 2020 07:53 )  WBC Count : 7.88 K/uL  RBC Count : 4.17 M/uL  Hemoglobin : 12.3 g/dL  Hematocrit : 37.1 %  Platelet Count - Automated : 150 K/uL  Mean Cell Volume : 89.0 fl  Mean Cell Hemoglobin : 29.5 pg  Mean Cell Hemoglobin Concentration : 33.2 gm/dL  Auto Neutrophil # : x  Auto Lymphocyte # : x  Auto Monocyte # : x  Auto Eosinophil # : x  Auto Basophil # : x  Auto Neutrophil % : x  Auto Lymphocyte % : x  Auto Monocyte % : x  Auto Eosinophil % : x  Auto Basophil % : x    11-05    137  |  105  |  16  ----------------------------<  138<H>  4.2   |  25  |  0.53    Ca    8.5      05 Nov 2020 07:53  Phos  4.2     11-05  Mg     2.3     11-05                          RADIOLOGY & ADDITIONAL STUDIES (The following images were personally reviewed):  Boston:                                     No  Urine output:                       adequate  DVT prophylaxis:                 Yes  Flattus:                                  Yes  Bowel movement:              No

## 2020-11-05 NOTE — DISCHARGE NOTE NURSING/CASE MANAGEMENT/SOCIAL WORK - PATIENT PORTAL LINK FT
You can access the FollowMyHealth Patient Portal offered by [facility] by registering at the following website: [email or web address]. By joining [employer]’s FollowMyHealth portal, you will also be able to view your health information using other applications (apps) compatible with our system.

## 2020-11-05 NOTE — PROGRESS NOTE ADULT - SUBJECTIVE AND OBJECTIVE BOX
HPI:  75 y.o. female with HTN presents today after discharge from hospital in [place], sent by Dr. Bosch for findings of large brain mass on CT/MR of the brain. Patient and her son at bedside report several months of worsening word finding difficulty, as well as reading and writing. Patient denies any headaches, visual/hearing changes, extremity weakness or numbness, gait disturbance, syncope or seizures. She denies any constitutional symptoms such as weight loss, generalized fatigue or anorexia. Patient comes with CDs with CT and MR. She denies use of Aspirin, or other anticoagulation. Patient was started on Keppra and Decadron in previous facility. (29 Oct 2020 19:05)    Hospital Course:   10/29: admitted to [facility] for brain tumor w/u. dopplers negative  10/30: LORENZA o/n, neuro stable. pending MR nelson, pre op monday 11/1: LORENZA o/n, improved aphasia, on decadron. preop for monday. MR nelson done. med clearance pending   11/2: POD0 L temporal crani for tumor resection. frozen: high grade malignant neoplasm. SG JPx1.   11/3: POD1. LORENZA o/n, neuro stable. passed TOV.   11/4: POD2: LORENZA overnight. Neuro stable. MRI today.   11/5: POD #3 LORENZA overnight, neuro stable. plan for discharge today    Vital Signs Last 24 Hrs  T(C): 36.2 (05 Nov 2020 09:34), Max: 37.1 (04 Nov 2020 20:43)  T(F): 97.1 (05 Nov 2020 09:34), Max: 98.7 (04 Nov 2020 20:43)  HR: 58 (05 Nov 2020 09:34) (55 - 66)  BP: 124/71 (05 Nov 2020 09:34) (111/71 - 155/93)  BP(mean): --  RR: 18 (05 Nov 2020 09:34) (16 - 21)  SpO2: 58% (05 Nov 2020 09:34) (58% - 99%)    I&O's Summary    04 Nov 2020 07:01  -  05 Nov 2020 07:00  --------------------------------------------------------  IN: 360 mL / OUT: 200 mL / NET: 160 mL        PHYSICAL EXAM:  Neurological:    Motor exam:  General laying bed, appears well. No acute distress.   Neurological: AOX3. + Expressive aphasia. Follows commands. OE spont. +mild R facial. CNII-XII intact. PERRL. EOMI.  Motor exam: +RUE pronator drift. RUE +4/5. LUE 5/5. RLE 5/5. LLE 5/5.   Sensation:  Strength intact to light touch throughout.   Cardiovascular: Regular rate and rhythm. S1/S2   Respiratory: Room air. Chest expansion symmetrical bilaterally. CTAB.   Gastrointestinal: Abdomen non-tender, non-distended.   Extremities: extremities warm, dry, nontender.   Incision/Wound: crani site clean, dry, and intact.     TUBES/LINES:  [] [place]  [] Lumbar Drain  [] Wound Drains  [] Others    DIET:  [] NPO  [x] Mechanical  [] Tube feeds    LABS:                        12.3   7.88  )-----------( 150      ( 05 Nov 2020 07:53 )             37.1     11-05    137  |  105  |  16  ----------------------------<  138<H>  4.2   |  25  |  0.53    Ca    8.5      05 Nov 2020 07:53  Phos  4.2     11-05  Mg     2.3     11-05              CAPILLARY BLOOD GLUCOSE          Drug Levels: [] N/A    CSF Analysis: [] N/A      Allergies    codeine (Unknown)  sulfa drugs (Unknown)    Intolerances      MEDICATIONS:  Antibiotics:    Neuro:  acetaminophen   Tablet .. 650 milliGRAM(s) Oral every 6 hours PRN  ketorolac   Injectable 15 milliGRAM(s) IV Push every 6 hours PRN  levETIRAcetam 500 milliGRAM(s) Oral every 12 hours  ondansetron Injectable 4 milliGRAM(s) IV Push every 6 hours PRN    Anticoagulation:  enoxaparin Injectable 40 milliGRAM(s) SubCutaneous <User Schedule>    OTHER:  amLODIPine   Tablet 10 milliGRAM(s) Oral daily  bisacodyl 5 milliGRAM(s) Oral daily PRN  carvedilol 6.25 milliGRAM(s) Oral every 12 hours  dexAMETHasone     Tablet   Oral   dexAMETHasone     Tablet 4 milliGRAM(s) Oral every 8 hours  dextrose 40% Gel 15 Gram(s) Oral once PRN  dextrose 50% Injectable 12.5 Gram(s) IV Push once  dextrose 50% Injectable 25 Gram(s) IV Push once  dextrose 50% Injectable 25 Gram(s) IV Push once  glucagon  Injectable 1 milliGRAM(s) IntraMuscular once PRN  hydrALAZINE Injectable 10 milliGRAM(s) IV Push every 3 hours PRN  lisinopril 40 milliGRAM(s) Oral daily  pantoprazole    Tablet 40 milliGRAM(s) Oral before breakfast  senna 2 Tablet(s) Oral at bedtime    IVF:  dextrose 5%. 1000 milliLiter(s) IV Continuous <Continuous>    CULTURES:    RADIOLOGY & ADDITIONAL TESTS:      ASSESSMENT:  75 year old female with HTN, large left temporal brain mass with worsening expressive aphasia now; POD3 s/p L temporal crani for tumor resection (11/2/20), frozen high grade malignant neoplasm    PLAN:  - Neuro checks Q4   - Vital signs Q4   - Pain management   - Continue with Keppra 500 BID.    - Dexamethasone taper.   - Continue with home blood pressure medications (carvedilol, lisinopril, & amlodipine)   - regular diet  - bowel regimen  - DVT PROPHYLAXIS: [x] Venodynes  [x] Heparin/Lovenox    DISPOSITION: rehab      Assessment:  Present when checked    []  GCS  E   V  M     Heart Failure: []Acute, [] acute on chronic , []chronic  Heart Failure:  [] Diastolic (HFpEF), [] Systolic (HFrEF), []Combined (HFpEF and HFrEF), [] RHF, [] Pulm HTN, [] Other    [] REYNA, [] ATN, [] AIN, [] other  [] CKD1, [] CKD2, [] CKD 3, [] CKD 4, [] CKD 5, []ESRD    Encephalopathy: [] Metabolic, [] Hepatic, [] toxic, [] Neurological, [] Other    Abnormal Nurtitional Status: [] malnurtition (see nutrition note), [ ]underweight: BMI < 19, [] morbid obesity: BMI >40, [] Cachexia    [] Sepsis  [] hypovolemic shock,[] cardiogenic shock, [] hemorrhagic shock, [] neuogenic shock  [] Acute Respiratory Failure  []Cerebral edema, [] Brain compression/ herniation,   [] Functional quadriplegia  [] Acute blood loss anemia

## 2020-11-05 NOTE — PROGRESS NOTE ADULT - ATTENDING COMMENTS
ATTENDING ATTESTATION:  I was physically present for the key portions of the evaluation and management (E/M) service provided.  I agree with the above history, physical and plan, which I have reviewed and edited where appropriate.    Patient at high risk for neurological deterioration or death due to:  ICU delirium, aspiration PNA, DVT / PE, seizure, hemorrhage  Critical care time:  I have personally provided 45 minutes of critical care time, excluding time spent on separate procedures.      Plan discussed with RN, house staff.
Patient seen and examined with house-staff during bedside rounds.  Resident note read, including vitals, physical findings, laboratory data, and radiological reports.   Revisions included below.  Direct personal management at bed side and extensive interpretation of the data.  Plan was outlined and discussed in details with the housestaff.  Decision making of high complexity  Action taken for acute disease activity to reflect the level of care provided:  - medication reconciliation  - review laboratory data  I disciussed with NS he stuttering

## 2020-11-05 NOTE — PROGRESS NOTE ADULT - REASON FOR ADMISSION
Brain Tumor

## 2020-11-05 NOTE — PROCEDURE NOTE - ADDITIONAL PROCEDURE DETAILS
ERICKA drain taken off suction, site prepped with chlorhexidine, anchoring suture removed, drain removed easily tip visualized, steristrips and clean dressing placed on top

## 2020-11-09 PROBLEM — Z00.00 ENCOUNTER FOR PREVENTIVE HEALTH EXAMINATION: Status: ACTIVE | Noted: 2020-11-09

## 2020-11-10 DIAGNOSIS — D62 ACUTE POSTHEMORRHAGIC ANEMIA: ICD-10-CM

## 2020-11-10 DIAGNOSIS — K21.9 GASTRO-ESOPHAGEAL REFLUX DISEASE WITHOUT ESOPHAGITIS: ICD-10-CM

## 2020-11-10 DIAGNOSIS — G93.5 COMPRESSION OF BRAIN: ICD-10-CM

## 2020-11-10 DIAGNOSIS — J44.9 CHRONIC OBSTRUCTIVE PULMONARY DISEASE, UNSPECIFIED: ICD-10-CM

## 2020-11-10 DIAGNOSIS — R47.01 APHASIA: ICD-10-CM

## 2020-11-10 DIAGNOSIS — Z98.890 OTHER SPECIFIED POSTPROCEDURAL STATES: ICD-10-CM

## 2020-11-10 DIAGNOSIS — C71.9 MALIGNANT NEOPLASM OF BRAIN, UNSPECIFIED: ICD-10-CM

## 2020-11-10 DIAGNOSIS — I10 ESSENTIAL (PRIMARY) HYPERTENSION: ICD-10-CM

## 2020-11-10 DIAGNOSIS — G93.6 CEREBRAL EDEMA: ICD-10-CM

## 2020-11-10 DIAGNOSIS — C71.2 MALIGNANT NEOPLASM OF TEMPORAL LOBE: ICD-10-CM

## 2020-11-12 LAB — SURGICAL PATHOLOGY STUDY: SIGNIFICANT CHANGE UP

## 2020-11-19 ENCOUNTER — NON-APPOINTMENT (OUTPATIENT)
Age: 75
End: 2020-11-19

## 2020-12-04 ENCOUNTER — APPOINTMENT (OUTPATIENT)
Dept: NEUROSURGERY | Facility: CLINIC | Age: 75
End: 2020-12-04

## 2020-12-11 ENCOUNTER — APPOINTMENT (OUTPATIENT)
Dept: NEUROSURGERY | Facility: CLINIC | Age: 75
End: 2020-12-11

## 2021-04-21 NOTE — DISCHARGE NOTE NURSING/CASE MANAGEMENT/SOCIAL WORK - NSCORESITESY/N_GEN_A_CORE_RD
----- Message from Arielle Best RN sent at 4/21/2021 11:27 AM CDT -----  A new Nurse Triage encounter of type Neck Pain or Stiffness has been   submitted for patient Amina Lupe.  The full triage details can be located   in Chart Review.   No

## 2022-12-27 NOTE — PRE-OP CHECKLIST - PATIENT SENT TO
operating room Oculoplastic Surgeon Procedure Text (F): After obtaining clear surgical margins the patient was sent to oculoplastics for surgical repair.  The patient understands they will receive post-surgical care and follow-up from the referring physician's office.

## 2024-02-14 NOTE — PROGRESS NOTE ADULT - NSTELEHEALTH_GEN_ALL_CORE
No
Quality 110: Preventive Care And Screening: Influenza Immunization: Influenza immunization was not ordered or administered, reason not given
Detail Level: Detailed